# Patient Record
Sex: MALE | Race: WHITE
[De-identification: names, ages, dates, MRNs, and addresses within clinical notes are randomized per-mention and may not be internally consistent; named-entity substitution may affect disease eponyms.]

---

## 2020-07-20 ENCOUNTER — HOSPITAL ENCOUNTER (INPATIENT)
Dept: HOSPITAL 19 - COL.ER | Age: 70
LOS: 15 days | Discharge: NURSING, REHAB OR LONG-TERM CARE | DRG: 207 | End: 2020-08-04
Attending: INTERNAL MEDICINE | Admitting: INTERNAL MEDICINE
Payer: MEDICARE

## 2020-07-20 VITALS — OXYGEN SATURATION: 98 %

## 2020-07-20 VITALS — OXYGEN SATURATION: 97 %

## 2020-07-20 VITALS — OXYGEN SATURATION: 96 %

## 2020-07-20 VITALS — OXYGEN SATURATION: 99 %

## 2020-07-20 VITALS — OXYGEN SATURATION: 94 %

## 2020-07-20 VITALS — WEIGHT: 315 LBS | BODY MASS INDEX: 37.19 KG/M2 | HEIGHT: 77.01 IN

## 2020-07-20 VITALS — OXYGEN SATURATION: 95 %

## 2020-07-20 VITALS — OXYGEN SATURATION: 89 %

## 2020-07-20 VITALS — TEMPERATURE: 97.6 F | SYSTOLIC BLOOD PRESSURE: 144 MMHG | DIASTOLIC BLOOD PRESSURE: 63 MMHG | HEART RATE: 74 BPM

## 2020-07-20 VITALS — OXYGEN SATURATION: 87 %

## 2020-07-20 VITALS — OXYGEN SATURATION: 86 %

## 2020-07-20 VITALS — OXYGEN SATURATION: 100 %

## 2020-07-20 VITALS — OXYGEN SATURATION: 93 %

## 2020-07-20 VITALS — OXYGEN SATURATION: 90 %

## 2020-07-20 DIAGNOSIS — E66.01: ICD-10-CM

## 2020-07-20 DIAGNOSIS — R31.9: ICD-10-CM

## 2020-07-20 DIAGNOSIS — R41.0: ICD-10-CM

## 2020-07-20 DIAGNOSIS — N17.9: ICD-10-CM

## 2020-07-20 DIAGNOSIS — U07.1: Primary | ICD-10-CM

## 2020-07-20 DIAGNOSIS — J12.89: ICD-10-CM

## 2020-07-20 DIAGNOSIS — I71.4: ICD-10-CM

## 2020-07-20 DIAGNOSIS — R19.7: ICD-10-CM

## 2020-07-20 DIAGNOSIS — E78.5: ICD-10-CM

## 2020-07-20 DIAGNOSIS — I95.9: ICD-10-CM

## 2020-07-20 DIAGNOSIS — I73.9: ICD-10-CM

## 2020-07-20 DIAGNOSIS — E87.6: ICD-10-CM

## 2020-07-20 DIAGNOSIS — Z90.89: ICD-10-CM

## 2020-07-20 DIAGNOSIS — I21.A1: ICD-10-CM

## 2020-07-20 DIAGNOSIS — I11.0: ICD-10-CM

## 2020-07-20 DIAGNOSIS — G72.9: ICD-10-CM

## 2020-07-20 DIAGNOSIS — T38.0X5A: ICD-10-CM

## 2020-07-20 DIAGNOSIS — Z95.0: ICD-10-CM

## 2020-07-20 DIAGNOSIS — I50.30: ICD-10-CM

## 2020-07-20 DIAGNOSIS — E11.9: ICD-10-CM

## 2020-07-20 DIAGNOSIS — J96.01: ICD-10-CM

## 2020-07-20 DIAGNOSIS — Z90.49: ICD-10-CM

## 2020-07-20 DIAGNOSIS — Z85.831: ICD-10-CM

## 2020-07-20 DIAGNOSIS — M10.9: ICD-10-CM

## 2020-07-20 DIAGNOSIS — I48.91: ICD-10-CM

## 2020-07-20 DIAGNOSIS — G47.33: ICD-10-CM

## 2020-07-20 LAB
ALBUMIN SERPL-MCNC: 3.6 GM/DL (ref 3.5–5)
ALP SERPL-CCNC: 170 U/L (ref 50–136)
ALT SERPL-CCNC: 142 U/L (ref 4–49)
ANION GAP SERPL CALC-SCNC: 12 MMOL/L (ref 7–16)
APAP SERPL-MCNC: < 10 UG/ML (ref 10–30)
AST SERPL-CCNC: 251 U/L (ref 15–37)
BASE EXCESS BLDA CALC-SCNC: -4.2 MMOL/L (ref -2–2)
BASE EXCESS BLDA CALC-SCNC: -6.2 MMOL/L (ref -2–2)
BASOPHILS # BLD: 0 10*3/UL (ref 0–0.2)
BASOPHILS NFR BLD AUTO: 0 % (ref 0–2)
BILIRUB SERPL-MCNC: 1.4 MG/DL (ref 0–1)
BUN SERPL-MCNC: 34 MG/DL (ref 9–20)
CALCIUM SERPL-MCNC: 8 MG/DL (ref 8.4–10.2)
CHLORIDE SERPL-SCNC: 99 MMOL/L (ref 98–107)
CO2 BLDA-SCNC: 17.8 MMOL/L
CO2 BLDA-SCNC: 19.8 MMOL/L
CO2 SERPL-SCNC: 21 MMOL/L (ref 22–30)
CREAT SERPL-SCNC: 1.84 UMOL/L (ref 0.66–1.25)
EOSINOPHIL # BLD: 0 10*3/UL (ref 0–0.7)
EOSINOPHIL NFR BLD: 0 % (ref 0–4)
ERYTHROCYTE [DISTWIDTH] IN BLOOD BY AUTOMATED COUNT: 12.7 % (ref 11.5–14.5)
GLUCOSE SERPL-MCNC: 173 MG/DL (ref 74–106)
GRANULOCYTES # BLD AUTO: 88.8 % (ref 42.2–75.2)
HCO3 BLDA-SCNC: 16.9 MEQ/L (ref 22–26)
HCO3 BLDA-SCNC: 18.9 MEQ/L (ref 22–26)
HCT VFR BLD AUTO: 40.3 % (ref 42–52)
HGB BLD-MCNC: 14 G/DL (ref 13.5–18)
INHALED O2 CONCENTRATION: 21 %
LYMPHOCYTES # BLD: 0.3 10*3/UL (ref 1.2–3.4)
LYMPHOCYTES NFR BLD: 5 % (ref 20–51)
MCH RBC QN AUTO: 34 PG (ref 27–31)
MCHC RBC AUTO-ENTMCNC: 35 G/DL (ref 33–37)
MCV RBC AUTO: 97 FL (ref 80–100)
MONOCYTES # BLD: 0.4 10*3/UL (ref 0.1–0.6)
MONOCYTES NFR BLD AUTO: 5.5 % (ref 1.7–9.3)
NEUTROPHILS # BLD: 6.1 10*3/UL (ref 1.4–6.5)
PCO2 BLDA: 27.8 MMHG (ref 35–45)
PCO2 BLDA: 29.7 MMHG (ref 35–45)
PLATELET # BLD AUTO: 240 K/MM3 (ref 130–400)
PMV BLD AUTO: 10.6 FL (ref 7.4–10.4)
PO2 BLDA: 34.3 MMHG (ref 80–100)
PO2 BLDA: 68.3 MMHG (ref 80–100)
POTASSIUM SERPL-SCNC: 3.4 MMOL/L (ref 3.4–5)
PROT SERPL-MCNC: 7.1 GM/DL (ref 6.4–8.2)
RBC # BLD AUTO: 4.16 M/MM3 (ref 4.2–5.6)
SAO2 % BLDA: 63.4 % (ref 92–100)
SAO2 % BLDA: 93.1 % (ref 92–100)
SODIUM SERPL-SCNC: 133 MMOL/L (ref 137–145)
TROPONIN I SERPL-MCNC: 0.09 NG/ML (ref 0–0.04)
TROPONIN-I 3 HR POST INITIAL: 0.06 NG/ML (ref 0–0.03)

## 2020-07-20 PROCEDURE — A4314 CATH W/DRAINAGE 2-WAY LATEX: HCPCS

## 2020-07-20 PROCEDURE — C1751 CATH, INF, PER/CENT/MIDLINE: HCPCS

## 2020-07-20 PROCEDURE — C9113 INJ PANTOPRAZOLE SODIUM, VIA: HCPCS

## 2020-07-20 NOTE — NUR
Patient has requested to use the restroom for a BM. With the assistance of
RT Sahra, Patient assisted up to bedside commode. Patient is unsteady on his
feet and does desaturate with exertion. Patient is a 2 assist to move. Patient
produces a BM and sample sent to lab. Patient returns to bed. Patient made
comfortable. He has no further needs at this time. Will continue to monitor.
Call light within reach.

## 2020-07-20 NOTE — NUR
Patient arrives at this time via ED cart with Oxymask in place. Patient moves
self over to unit bed via slide. Patient attached to unit monitoring
equipment. Patient's O2 sat found to be at 58% with a perfect waveform.
Patient switched back to BiPAP immediately and O2 comes up. Patient switched
from street clothes to gown. Patient states he is only slightly SOA, but
otherwise feels fine. Assessment complete, see admission B for details. Lungs
are diminished to auscultation. Patient does have some slight BLE edema. Med
rec complete as well as allergies. Patient is a PUI and precautions followed
since arrival. Dr Reid notified of arrival. Confirmed that he needs
potassium replacement, also put on AHA diet, ok to give oral replacement of
K+. Patient has no further needs at this time. Will provide care as orders
come through. Will continue to monitor Call light within reach. SPoke with
son, Salvador, and wife, Mireya and gave updates to both.

## 2020-07-21 VITALS — OXYGEN SATURATION: 98 %

## 2020-07-21 VITALS — OXYGEN SATURATION: 96 %

## 2020-07-21 VITALS — OXYGEN SATURATION: 97 %

## 2020-07-21 VITALS — OXYGEN SATURATION: 95 %

## 2020-07-21 VITALS — OXYGEN SATURATION: 92 %

## 2020-07-21 VITALS — OXYGEN SATURATION: 94 %

## 2020-07-21 VITALS — OXYGEN SATURATION: 91 %

## 2020-07-21 VITALS — OXYGEN SATURATION: 89 %

## 2020-07-21 VITALS — OXYGEN SATURATION: 99 %

## 2020-07-21 VITALS — OXYGEN SATURATION: 90 %

## 2020-07-21 VITALS — HEART RATE: 70 BPM | SYSTOLIC BLOOD PRESSURE: 120 MMHG | DIASTOLIC BLOOD PRESSURE: 70 MMHG | TEMPERATURE: 99.8 F

## 2020-07-21 VITALS — OXYGEN SATURATION: 88 %

## 2020-07-21 VITALS — DIASTOLIC BLOOD PRESSURE: 71 MMHG | SYSTOLIC BLOOD PRESSURE: 166 MMHG | OXYGEN SATURATION: 98 % | HEART RATE: 87 BPM

## 2020-07-21 VITALS — OXYGEN SATURATION: 93 % | DIASTOLIC BLOOD PRESSURE: 44 MMHG | SYSTOLIC BLOOD PRESSURE: 67 MMHG | HEART RATE: 69 BPM

## 2020-07-21 VITALS
SYSTOLIC BLOOD PRESSURE: 139 MMHG | HEART RATE: 71 BPM | TEMPERATURE: 98.1 F | DIASTOLIC BLOOD PRESSURE: 100 MMHG | OXYGEN SATURATION: 94 %

## 2020-07-21 VITALS
HEART RATE: 104 BPM | TEMPERATURE: 98.8 F | OXYGEN SATURATION: 98 % | DIASTOLIC BLOOD PRESSURE: 63 MMHG | SYSTOLIC BLOOD PRESSURE: 145 MMHG

## 2020-07-21 VITALS — OXYGEN SATURATION: 87 %

## 2020-07-21 VITALS — SYSTOLIC BLOOD PRESSURE: 114 MMHG | DIASTOLIC BLOOD PRESSURE: 62 MMHG | HEART RATE: 69 BPM

## 2020-07-21 VITALS — DIASTOLIC BLOOD PRESSURE: 54 MMHG | HEART RATE: 93 BPM | SYSTOLIC BLOOD PRESSURE: 107 MMHG

## 2020-07-21 VITALS — DIASTOLIC BLOOD PRESSURE: 44 MMHG | HEART RATE: 69 BPM | SYSTOLIC BLOOD PRESSURE: 67 MMHG | OXYGEN SATURATION: 93 %

## 2020-07-21 VITALS — OXYGEN SATURATION: 93 %

## 2020-07-21 VITALS — OXYGEN SATURATION: 95 % | SYSTOLIC BLOOD PRESSURE: 75 MMHG | DIASTOLIC BLOOD PRESSURE: 41 MMHG | HEART RATE: 78 BPM

## 2020-07-21 VITALS — HEART RATE: 69 BPM | SYSTOLIC BLOOD PRESSURE: 100 MMHG | DIASTOLIC BLOOD PRESSURE: 53 MMHG | TEMPERATURE: 99.2 F

## 2020-07-21 VITALS — SYSTOLIC BLOOD PRESSURE: 101 MMHG | HEART RATE: 69 BPM | OXYGEN SATURATION: 98 % | DIASTOLIC BLOOD PRESSURE: 62 MMHG

## 2020-07-21 VITALS — HEART RATE: 69 BPM | DIASTOLIC BLOOD PRESSURE: 70 MMHG | TEMPERATURE: 97.5 F | SYSTOLIC BLOOD PRESSURE: 126 MMHG

## 2020-07-21 VITALS — DIASTOLIC BLOOD PRESSURE: 63 MMHG | SYSTOLIC BLOOD PRESSURE: 145 MMHG | OXYGEN SATURATION: 97 % | HEART RATE: 93 BPM

## 2020-07-21 VITALS — OXYGEN SATURATION: 86 %

## 2020-07-21 VITALS — HEART RATE: 81 BPM | DIASTOLIC BLOOD PRESSURE: 87 MMHG | SYSTOLIC BLOOD PRESSURE: 139 MMHG

## 2020-07-21 VITALS — OXYGEN SATURATION: 85 %

## 2020-07-21 VITALS
TEMPERATURE: 97.7 F | HEART RATE: 69 BPM | SYSTOLIC BLOOD PRESSURE: 120 MMHG | DIASTOLIC BLOOD PRESSURE: 61 MMHG | OXYGEN SATURATION: 98 %

## 2020-07-21 VITALS — SYSTOLIC BLOOD PRESSURE: 119 MMHG | HEART RATE: 69 BPM | OXYGEN SATURATION: 99 % | DIASTOLIC BLOOD PRESSURE: 62 MMHG

## 2020-07-21 VITALS — OXYGEN SATURATION: 83 %

## 2020-07-21 VITALS — HEART RATE: 93 BPM | SYSTOLIC BLOOD PRESSURE: 107 MMHG | DIASTOLIC BLOOD PRESSURE: 54 MMHG | OXYGEN SATURATION: 95 %

## 2020-07-21 VITALS — OXYGEN SATURATION: 78 %

## 2020-07-21 VITALS — SYSTOLIC BLOOD PRESSURE: 145 MMHG | DIASTOLIC BLOOD PRESSURE: 63 MMHG | HEART RATE: 93 BPM

## 2020-07-21 VITALS — DIASTOLIC BLOOD PRESSURE: 54 MMHG | SYSTOLIC BLOOD PRESSURE: 107 MMHG | HEART RATE: 93 BPM

## 2020-07-21 VITALS — HEART RATE: 78 BPM | SYSTOLIC BLOOD PRESSURE: 75 MMHG | DIASTOLIC BLOOD PRESSURE: 41 MMHG

## 2020-07-21 VITALS — OXYGEN SATURATION: 82 %

## 2020-07-21 VITALS — OXYGEN SATURATION: 71 %

## 2020-07-21 VITALS — OXYGEN SATURATION: 81 %

## 2020-07-21 VITALS — OXYGEN SATURATION: 84 %

## 2020-07-21 LAB
ALBUMIN SERPL-MCNC: 3.1 GM/DL (ref 3.5–5)
ALP SERPL-CCNC: 157 U/L (ref 50–136)
ALT SERPL-CCNC: 122 U/L (ref 4–49)
ANION GAP SERPL CALC-SCNC: 13 MMOL/L (ref 7–16)
ANION GAP SERPL CALC-SCNC: 8 MMOL/L (ref 7–16)
AST SERPL-CCNC: 177 U/L (ref 15–37)
BASE EXCESS BLDA CALC-SCNC: -2.6 MMOL/L (ref -2–2)
BASE EXCESS BLDA CALC-SCNC: -7 MMOL/L (ref -2–2)
BASE EXCESS BLDA CALC-SCNC: -8.8 MMOL/L (ref -2–2)
BASE EXCESS BLDA CALC-SCNC: -9.7 MMOL/L (ref -2–2)
BASOPHILS # BLD: 0 10*3/UL (ref 0–0.2)
BASOPHILS NFR BLD AUTO: 0.2 % (ref 0–2)
BILIRUB SERPL-MCNC: 1.2 MG/DL (ref 0–1)
BUN SERPL-MCNC: 32 MG/DL (ref 9–20)
BUN SERPL-MCNC: 33 MG/DL (ref 9–20)
C DIFF TOX A+B STL IA-ACNC: (no result)
C DIFF TOX A+B STL QL IA: (no result)
CALCIUM SERPL-MCNC: 7.7 MG/DL (ref 8.4–10.2)
CALCIUM SERPL-MCNC: 7.8 MG/DL (ref 8.4–10.2)
CHLORIDE SERPL-SCNC: 100 MMOL/L (ref 98–107)
CHLORIDE SERPL-SCNC: 101 MMOL/L (ref 98–107)
CHOLEST SPEC-SCNC: 99 MG/DL (ref 120–200)
CHOLEST/HDLC SERPL-SRTO: 4.7
CO2 BLDA-SCNC: 18.2 MMOL/L
CO2 BLDA-SCNC: 19.1 MMOL/L
CO2 BLDA-SCNC: 19.7 MMOL/L
CO2 SERPL-SCNC: 20 MMOL/L (ref 22–30)
CO2 SERPL-SCNC: 26 MMOL/L (ref 22–30)
CREAT SERPL-SCNC: 1.53 UMOL/L (ref 0.66–1.25)
CREAT SERPL-SCNC: 1.8 UMOL/L (ref 0.66–1.25)
EOSINOPHIL # BLD: 0 10*3/UL (ref 0–0.7)
EOSINOPHIL NFR BLD: 0.3 % (ref 0–4)
ERYTHROCYTE [DISTWIDTH] IN BLOOD BY AUTOMATED COUNT: 12.8 % (ref 11.5–14.5)
GLUCOSE SERPL-MCNC: 134 MG/DL (ref 74–106)
GLUCOSE SERPL-MCNC: 269 MG/DL (ref 74–106)
GRANULOCYTES # BLD AUTO: 90 % (ref 42.2–75.2)
HCO3 BLDA-SCNC: 17 MEQ/L (ref 22–26)
HCO3 BLDA-SCNC: 18 MEQ/L (ref 22–26)
HCO3 BLDA-SCNC: 18.4 MEQ/L (ref 22–26)
HCO3 BLDA-SCNC: 20 MEQ/L (ref 22–26)
HCT VFR BLD AUTO: 38.7 % (ref 42–52)
HDLC SERPL-MCNC: 21 MG/DL
HGB BLD-MCNC: 13.5 G/DL (ref 13.5–18)
INHALED O2 CONCENTRATION: 100 %
INHALED O2 CONCENTRATION: 100 %
INHALED O2 CONCENTRATION: 65 %
INHALED O2 CONCENTRATION: 80 %
LDLC SERPL-MCNC: 57 MG/DL
LYMPHOCYTES # BLD: 0.4 10*3/UL (ref 1.2–3.4)
LYMPHOCYTES NFR BLD: 5.6 % (ref 20–51)
MAGNESIUM SERPL-MCNC: 1.9 MG/DL (ref 1.6–2.3)
MCH RBC QN AUTO: 34 PG (ref 27–31)
MCHC RBC AUTO-ENTMCNC: 35 G/DL (ref 33–37)
MCV RBC AUTO: 98 FL (ref 80–100)
MONOCYTES # BLD: 0.2 10*3/UL (ref 0.1–0.6)
MONOCYTES NFR BLD AUTO: 3.3 % (ref 1.7–9.3)
NEUTROPHILS # BLD: 5.8 10*3/UL (ref 1.4–6.5)
PCO2 BLDA: 28.9 MMHG (ref 35–45)
PCO2 BLDA: 35 MMHG (ref 35–45)
PCO2 BLDA: 39.9 MMHG (ref 35–45)
PCO2 BLDA: 44.1 MMHG (ref 35–45)
PHOSPHATE SERPL-MCNC: 4.2 MG/DL (ref 2.5–4.5)
PLATELET # BLD AUTO: 215 K/MM3 (ref 130–400)
PMV BLD AUTO: 10.4 FL (ref 7.4–10.4)
PO2 BLDA: 123.4 MMHG (ref 80–100)
PO2 BLDA: 128.8 MMHG (ref 80–100)
PO2 BLDA: 152.4 MMHG (ref 80–100)
PO2 BLDA: 72.8 MMHG (ref 80–100)
POTASSIUM SERPL-SCNC: 3.5 MMOL/L (ref 3.4–5)
POTASSIUM SERPL-SCNC: 4 MMOL/L (ref 3.4–5)
PROT SERPL-MCNC: 6.5 GM/DL (ref 6.4–8.2)
RBC # BLD AUTO: 3.97 M/MM3 (ref 4.2–5.6)
SAO2 % BLDA: 94.9 % (ref 92–100)
SAO2 % BLDA: 98.1 % (ref 92–100)
SAO2 % BLDA: 98.3 % (ref 92–100)
SAO2 % BLDA: 98.8 % (ref 92–100)
SODIUM SERPL-SCNC: 133 MMOL/L (ref 137–145)
SODIUM SERPL-SCNC: 134 MMOL/L (ref 137–145)
TRIGL SERPL-MCNC: 107 MG/DL
TROPONIN I SERPL-MCNC: 0.05 NG/ML (ref 0–0.04)

## 2020-07-21 PROCEDURE — 5A1955Z RESPIRATORY VENTILATION, GREATER THAN 96 CONSECUTIVE HOURS: ICD-10-PCS | Performed by: FAMILY MEDICINE

## 2020-07-21 PROCEDURE — 0BH17EZ INSERTION OF ENDOTRACHEAL AIRWAY INTO TRACHEA, VIA NATURAL OR ARTIFICIAL OPENING: ICD-10-PCS | Performed by: FAMILY MEDICINE

## 2020-07-21 PROCEDURE — 02HV33Z INSERTION OF INFUSION DEVICE INTO SUPERIOR VENA CAVA, PERCUTANEOUS APPROACH: ICD-10-PCS

## 2020-07-21 NOTE — NUR
Spoke to patients wife, Mireya, and updated her on the patients status post
intubation. Answered all questions she had.

## 2020-07-21 NOTE — NUR
Patient asleep and resting well on the vent. Vitals obtained and remain
stable. Assessment complete, see shift assessment for details. Patient has no
gastric residuals, placement confirmed by auscultation. OG tube clamped for
medications. No further needs at this time. Will continue to monitor. Call
light within reach.

## 2020-07-21 NOTE — NUR
Tried to switch patient to airvo and patient was not maintaing O2 sats.
 notified and said to call anesthesia so we can intubate patient.
Anestesia was called and notified.

## 2020-07-21 NOTE — NUR
contacted patient's wife, Mireya (ph#[phone]) as patient is
intubated. Patient lives in [place] with Mireya and sees Dr. Torres for
primary care. Patient obtains medications from [facility] with no
difficulties. Patient uses a CPAP and no other DME. Mireya reports patient is
normally independent with ADLS but started having issues about eight days ago.
Mireya reports patient has Advance Directives and will email LUISA a copy. LUISA
provided email and phone number to Mireya. LUISA will continue to follow for
discharge needs.

## 2020-07-21 NOTE — NUR
at bedside. Told RT to switch patient over to airvo and check ABG 2
hours after. Notified him that patients covid swab came back positive. He
called family and told patient.

## 2020-07-21 NOTE — NUR
Patient successfully intubated. RN, RT and CRNA in room. Confirmed with cxray.
Powers, OG and PICC were placed after.  will be back at 1600 to prone
patient.

## 2020-07-22 VITALS — OXYGEN SATURATION: 98 %

## 2020-07-22 VITALS — OXYGEN SATURATION: 96 %

## 2020-07-22 VITALS — HEART RATE: 69 BPM | OXYGEN SATURATION: 97 % | SYSTOLIC BLOOD PRESSURE: 132 MMHG | DIASTOLIC BLOOD PRESSURE: 69 MMHG

## 2020-07-22 VITALS — OXYGEN SATURATION: 95 %

## 2020-07-22 VITALS — OXYGEN SATURATION: 97 %

## 2020-07-22 VITALS — OXYGEN SATURATION: 100 %

## 2020-07-22 VITALS
TEMPERATURE: 96.4 F | SYSTOLIC BLOOD PRESSURE: 90 MMHG | HEART RATE: 69 BPM | OXYGEN SATURATION: 98 % | DIASTOLIC BLOOD PRESSURE: 66 MMHG

## 2020-07-22 VITALS — OXYGEN SATURATION: 92 %

## 2020-07-22 VITALS — OXYGEN SATURATION: 99 %

## 2020-07-22 VITALS — SYSTOLIC BLOOD PRESSURE: 79 MMHG | HEART RATE: 59 BPM | DIASTOLIC BLOOD PRESSURE: 56 MMHG | TEMPERATURE: 96 F

## 2020-07-22 VITALS — OXYGEN SATURATION: 85 %

## 2020-07-22 VITALS — SYSTOLIC BLOOD PRESSURE: 106 MMHG | DIASTOLIC BLOOD PRESSURE: 63 MMHG | TEMPERATURE: 97.3 F | HEART RATE: 69 BPM

## 2020-07-22 VITALS — SYSTOLIC BLOOD PRESSURE: 116 MMHG | DIASTOLIC BLOOD PRESSURE: 64 MMHG | HEART RATE: 69 BPM

## 2020-07-22 VITALS — SYSTOLIC BLOOD PRESSURE: 118 MMHG | DIASTOLIC BLOOD PRESSURE: 54 MMHG | HEART RATE: 69 BPM | TEMPERATURE: 99.3 F

## 2020-07-22 VITALS — TEMPERATURE: 97.5 F | DIASTOLIC BLOOD PRESSURE: 67 MMHG | HEART RATE: 69 BPM | SYSTOLIC BLOOD PRESSURE: 112 MMHG

## 2020-07-22 VITALS — SYSTOLIC BLOOD PRESSURE: 121 MMHG | TEMPERATURE: 96.6 F | HEART RATE: 69 BPM | DIASTOLIC BLOOD PRESSURE: 61 MMHG

## 2020-07-22 VITALS — DIASTOLIC BLOOD PRESSURE: 60 MMHG | HEART RATE: 69 BPM | SYSTOLIC BLOOD PRESSURE: 118 MMHG

## 2020-07-22 VITALS — OXYGEN SATURATION: 90 %

## 2020-07-22 VITALS — SYSTOLIC BLOOD PRESSURE: 104 MMHG | DIASTOLIC BLOOD PRESSURE: 68 MMHG | HEART RATE: 73 BPM

## 2020-07-22 VITALS — HEART RATE: 69 BPM | DIASTOLIC BLOOD PRESSURE: 70 MMHG | SYSTOLIC BLOOD PRESSURE: 115 MMHG

## 2020-07-22 VITALS — OXYGEN SATURATION: 88 %

## 2020-07-22 VITALS — OXYGEN SATURATION: 94 %

## 2020-07-22 VITALS — OXYGEN SATURATION: 91 %

## 2020-07-22 VITALS — OXYGEN SATURATION: 89 %

## 2020-07-22 VITALS — OXYGEN SATURATION: 87 %

## 2020-07-22 VITALS — OXYGEN SATURATION: 93 %

## 2020-07-22 LAB
ALBUMIN SERPL-MCNC: 2.9 GM/DL (ref 3.5–5)
ALP SERPL-CCNC: 138 U/L (ref 50–136)
ALT SERPL-CCNC: 89 U/L (ref 4–49)
ANION GAP SERPL CALC-SCNC: 10 MMOL/L (ref 7–16)
AST SERPL-CCNC: 108 U/L (ref 15–37)
BASE EXCESS BLDA CALC-SCNC: -6 MMOL/L (ref -2–2)
BILIRUB SERPL-MCNC: 1.3 MG/DL (ref 0–1)
BUN SERPL-MCNC: 35 MG/DL (ref 9–20)
CALCIUM SERPL-MCNC: 7.5 MG/DL (ref 8.4–10.2)
CHLORIDE SERPL-SCNC: 100 MMOL/L (ref 98–107)
CO2 BLDA-SCNC: 18.3 MMOL/L
CO2 SERPL-SCNC: 25 MMOL/L (ref 22–30)
CREAT SERPL-SCNC: 1.61 UMOL/L (ref 0.66–1.25)
ERYTHROCYTE [DISTWIDTH] IN BLOOD BY AUTOMATED COUNT: 13 % (ref 11.5–14.5)
GLUCOSE SERPL-MCNC: 213 MG/DL (ref 74–106)
HCO3 BLDA-SCNC: 17.4 MEQ/L (ref 22–26)
HCT VFR BLD AUTO: 36.9 % (ref 42–52)
HGB BLD-MCNC: 12.5 G/DL (ref 13.5–18)
HYPOCHROMIA BLD QL SMEAR: (no result)
INHALED O2 CONCENTRATION: 65 %
LYMPHOCYTES NFR BLD MANUAL: 4 % (ref 20–51)
MAGNESIUM SERPL-MCNC: 2 MG/DL (ref 1.6–2.3)
MCH RBC QN AUTO: 34 PG (ref 27–31)
MCHC RBC AUTO-ENTMCNC: 34 G/DL (ref 33–37)
MCV RBC AUTO: 99 FL (ref 80–100)
MONOCYTES NFR BLD: 2 % (ref 1.7–9.3)
NEUTS BAND NFR BLD: 12 % (ref 0–10)
NEUTS SEG NFR BLD MANUAL: 82 % (ref 42–75.2)
PCO2 BLDA: 29.1 MMHG (ref 35–45)
PHOSPHATE SERPL-MCNC: 3.2 MG/DL (ref 2.5–4.5)
PLATELET # BLD AUTO: 198 K/MM3 (ref 130–400)
PLATELET BLD QL SMEAR: NORMAL
PMV BLD AUTO: 10.6 FL (ref 7.4–10.4)
PO2 BLDA: 82.8 MMHG (ref 80–100)
POTASSIUM SERPL-SCNC: 3.3 MMOL/L (ref 3.4–5)
PROT SERPL-MCNC: 6 GM/DL (ref 6.4–8.2)
RBC # BLD AUTO: 3.73 M/MM3 (ref 4.2–5.6)
SAO2 % BLDA: 96.3 % (ref 92–100)
SODIUM SERPL-SCNC: 134 MMOL/L (ref 137–145)

## 2020-07-22 NOTE — NUR
PT placed in prone position  AT 1700 WITH HELP OF 3RNs and RT. Patient
tolerated well. Tube feedings on hold. LIS restarted. Will continue to
monitor.

## 2020-07-22 NOTE — NUR
Patient awakens much more readily now that sedation is turned down. He follows
commands and answers questions appropriately. Does nod his head when asked if
in pain, he signifies a little bit. Fentanyl not turned down at this time.

## 2020-07-22 NOTE — NUR
Patient continues to rest peacefully. Assessment complete with no changes from
previous exam, OG tube restarted and has slight light green output when
changed back to LIS. Repositioned for comfort. No further needs at this time.
Will continue to monitor. Call light within reach.

## 2020-07-23 VITALS — OXYGEN SATURATION: 96 %

## 2020-07-23 VITALS — OXYGEN SATURATION: 97 %

## 2020-07-23 VITALS — OXYGEN SATURATION: 98 %

## 2020-07-23 VITALS — OXYGEN SATURATION: 95 %

## 2020-07-23 VITALS — OXYGEN SATURATION: 99 %

## 2020-07-23 VITALS — OXYGEN SATURATION: 100 %

## 2020-07-23 VITALS
SYSTOLIC BLOOD PRESSURE: 110 MMHG | DIASTOLIC BLOOD PRESSURE: 88 MMHG | OXYGEN SATURATION: 99 % | TEMPERATURE: 97.8 F | HEART RATE: 69 BPM

## 2020-07-23 VITALS — SYSTOLIC BLOOD PRESSURE: 96 MMHG | HEART RATE: 69 BPM | DIASTOLIC BLOOD PRESSURE: 60 MMHG | OXYGEN SATURATION: 96 %

## 2020-07-23 VITALS — OXYGEN SATURATION: 93 %

## 2020-07-23 VITALS — OXYGEN SATURATION: 94 %

## 2020-07-23 VITALS — HEART RATE: 69 BPM | TEMPERATURE: 98 F | DIASTOLIC BLOOD PRESSURE: 61 MMHG | SYSTOLIC BLOOD PRESSURE: 101 MMHG

## 2020-07-23 VITALS — HEART RATE: 69 BPM | TEMPERATURE: 99.1 F | DIASTOLIC BLOOD PRESSURE: 64 MMHG | SYSTOLIC BLOOD PRESSURE: 95 MMHG

## 2020-07-23 VITALS
OXYGEN SATURATION: 97 % | DIASTOLIC BLOOD PRESSURE: 64 MMHG | TEMPERATURE: 99.1 F | SYSTOLIC BLOOD PRESSURE: 101 MMHG | HEART RATE: 69 BPM

## 2020-07-23 VITALS — OXYGEN SATURATION: 88 %

## 2020-07-23 LAB
ALBUMIN SERPL-MCNC: 2.7 GM/DL (ref 3.5–5)
ALP SERPL-CCNC: 119 U/L (ref 50–136)
ALT SERPL-CCNC: 70 U/L (ref 4–49)
ANION GAP SERPL CALC-SCNC: 10 MMOL/L (ref 7–16)
AST SERPL-CCNC: 72 U/L (ref 15–37)
BASE EXCESS BLDA CALC-SCNC: -6.7 MMOL/L (ref -2–2)
BASE EXCESS BLDA CALC-SCNC: -8.1 MMOL/L (ref -2–2)
BILIRUB SERPL-MCNC: 0.8 MG/DL (ref 0–1)
BUN SERPL-MCNC: 47 MG/DL (ref 9–20)
CALCIUM SERPL-MCNC: 7.1 MG/DL (ref 8.4–10.2)
CHLORIDE SERPL-SCNC: 103 MMOL/L (ref 98–107)
CO2 BLDA-SCNC: 17.4 MMOL/L
CO2 BLDA-SCNC: 18.8 MMOL/L
CO2 SERPL-SCNC: 22 MMOL/L (ref 22–30)
CREAT SERPL-SCNC: 2 UMOL/L (ref 0.66–1.25)
ERYTHROCYTE [DISTWIDTH] IN BLOOD BY AUTOMATED COUNT: 13 % (ref 11.5–14.5)
GLUCOSE SERPL-MCNC: 157 MG/DL (ref 74–106)
HCO3 BLDA-SCNC: 16.4 MEQ/L (ref 22–26)
HCO3 BLDA-SCNC: 17.8 MEQ/L (ref 22–26)
HCT VFR BLD AUTO: 45.6 % (ref 42–52)
HGB BLD-MCNC: 15.8 G/DL (ref 13.5–18)
INHALED O2 CONCENTRATION: 50 %
INHALED O2 CONCENTRATION: 55 %
LYMPHOCYTES NFR BLD MANUAL: 6 % (ref 20–51)
MAGNESIUM SERPL-MCNC: 2 MG/DL (ref 1.6–2.3)
MCH RBC QN AUTO: 33 PG (ref 27–31)
MCHC RBC AUTO-ENTMCNC: 35 G/DL (ref 33–37)
MCV RBC AUTO: 96 FL (ref 80–100)
MONOCYTES NFR BLD: 2 % (ref 1.7–9.3)
NEUTS BAND NFR BLD: 10 % (ref 0–10)
NEUTS SEG NFR BLD MANUAL: 82 % (ref 42–75.2)
PCO2 BLDA: 30.4 MMHG (ref 35–45)
PCO2 BLDA: 32.6 MMHG (ref 35–45)
PHOSPHATE SERPL-MCNC: 5.3 MG/DL (ref 2.5–4.5)
PLATELET # BLD AUTO: 187 K/MM3 (ref 130–400)
PLATELET BLD QL SMEAR: NORMAL
PMV BLD AUTO: 11.1 FL (ref 7.4–10.4)
PO2 BLDA: 109.2 MMHG (ref 80–100)
PO2 BLDA: 117 MMHG (ref 80–100)
POTASSIUM SERPL-SCNC: 3.6 MMOL/L (ref 3.4–5)
PROT SERPL-MCNC: 5.9 GM/DL (ref 6.4–8.2)
RBC # BLD AUTO: 4.73 M/MM3 (ref 4.2–5.6)
SAO2 % BLDA: 97.7 % (ref 92–100)
SAO2 % BLDA: 98.1 % (ref 92–100)
SODIUM SERPL-SCNC: 134 MMOL/L (ref 137–145)

## 2020-07-23 NOTE — NUR
PATIENT OFF SESDATION AND ANESTHIA FOR A SMALL AMOOUNT OF TIME BEGINS COUGH
WITH MOVEMENT BACK ON SEDATION CAUSE OF PRONE POSITION

## 2020-07-23 NOTE — NUR
1930 - REPORT RECEIVED FROM VON GEORGE.
2000 - PT AWAKE UPON ENTRY AS PROPOFOL COMPLETED, NEW BOTTLE HUNG AND PT PUT
BACK TO SEDATION. PT DOES FOLLOW COMMAND AND NODS WHEN AWAKE. PT REMAINS ON
PRONE POSITION, VENT SETTINGS CHECKED, ALL LINES AND TUBES REVIEWED. VSS. WILL
CONTINUE MONITORING.

## 2020-07-23 NOTE — NUR
PT PLACED IN PRONE POSITION PER  ORDER.  PT TOLERATING IT WELL.
ALARMS SET AND FUNCTIONING.  VITAL SIGNS STABLE.

## 2020-07-23 NOTE — NUR
Sedation vacation continued throughout shift. patient tolerated this dosing
without issue. Rests with eyes closed but rouses easily to voice. Able to
communicate needs, and follow commands.
 
Urine output severely decreased throughout shift. Nephrology was consulted and
rounded this evening. ATN diagnosed. Urine is tea colored with coffee ground
sediment. TF resumed at 15ml/hr at 1200, increased to 30ml/hr at this time.
 
Following TF hold, Pt
placed in prone position with assist x 4 (3rn and Respiratory therapist)
Right arm secured above head, Left arm secured at waist. Pt continues to
communicate needs and follow commands, requests sedation be increased while
prone. See flowsheet for dosing.
 
1900 report provided to oncoming shift.
 
2000 update provided to Andre JI requested. RT notified.
 
Afebrile throughout shift. Tmax 98.3.

## 2020-07-24 VITALS — OXYGEN SATURATION: 100 %

## 2020-07-24 VITALS — OXYGEN SATURATION: 96 %

## 2020-07-24 VITALS — OXYGEN SATURATION: 95 %

## 2020-07-24 VITALS — OXYGEN SATURATION: 93 %

## 2020-07-24 VITALS — OXYGEN SATURATION: 97 %

## 2020-07-24 VITALS
SYSTOLIC BLOOD PRESSURE: 103 MMHG | OXYGEN SATURATION: 98 % | TEMPERATURE: 97.8 F | HEART RATE: 69 BPM | DIASTOLIC BLOOD PRESSURE: 64 MMHG

## 2020-07-24 VITALS — TEMPERATURE: 96.2 F | HEART RATE: 69 BPM | DIASTOLIC BLOOD PRESSURE: 71 MMHG | SYSTOLIC BLOOD PRESSURE: 116 MMHG

## 2020-07-24 VITALS — OXYGEN SATURATION: 94 %

## 2020-07-24 VITALS — DIASTOLIC BLOOD PRESSURE: 69 MMHG | HEART RATE: 69 BPM | SYSTOLIC BLOOD PRESSURE: 111 MMHG | TEMPERATURE: 96.9 F

## 2020-07-24 VITALS — TEMPERATURE: 97.1 F | DIASTOLIC BLOOD PRESSURE: 65 MMHG | HEART RATE: 69 BPM | SYSTOLIC BLOOD PRESSURE: 105 MMHG

## 2020-07-24 VITALS — OXYGEN SATURATION: 92 %

## 2020-07-24 VITALS — OXYGEN SATURATION: 99 %

## 2020-07-24 VITALS — OXYGEN SATURATION: 98 %

## 2020-07-24 VITALS
SYSTOLIC BLOOD PRESSURE: 102 MMHG | HEART RATE: 69 BPM | OXYGEN SATURATION: 95 % | TEMPERATURE: 98 F | DIASTOLIC BLOOD PRESSURE: 64 MMHG

## 2020-07-24 VITALS — SYSTOLIC BLOOD PRESSURE: 128 MMHG | TEMPERATURE: 97.8 F | DIASTOLIC BLOOD PRESSURE: 78 MMHG | HEART RATE: 69 BPM

## 2020-07-24 LAB
ALBUMIN SERPL-MCNC: 2.7 GM/DL (ref 3.5–5)
ALP SERPL-CCNC: 122 U/L (ref 50–136)
ALT SERPL-CCNC: 70 U/L (ref 4–49)
ANION GAP SERPL CALC-SCNC: 9 MMOL/L (ref 7–16)
AST SERPL-CCNC: 85 U/L (ref 15–37)
BASE EXCESS BLDA CALC-SCNC: -5.8 MMOL/L (ref -2–2)
BILIRUB SERPL-MCNC: 0.7 MG/DL (ref 0–1)
BUN SERPL-MCNC: 60 MG/DL (ref 9–20)
CALCIUM SERPL-MCNC: 7.1 MG/DL (ref 8.4–10.2)
CHLORIDE SERPL-SCNC: 105 MMOL/L (ref 98–107)
CO2 BLDA-SCNC: 18.9 MMOL/L
CO2 SERPL-SCNC: 20 MMOL/L (ref 22–30)
CREAT SERPL-SCNC: 2.03 UMOL/L (ref 0.66–1.25)
ERYTHROCYTE [DISTWIDTH] IN BLOOD BY AUTOMATED COUNT: 13.4 % (ref 11.5–14.5)
GLUCOSE SERPL-MCNC: 200 MG/DL (ref 74–106)
HCO3 BLDA-SCNC: 18 MEQ/L (ref 22–26)
HCT VFR BLD AUTO: 34.3 % (ref 42–52)
HGB BLD-MCNC: 11.7 G/DL (ref 13.5–18)
INHALED O2 CONCENTRATION: 50 %
LYMPHOCYTES NFR BLD MANUAL: 1 % (ref 20–51)
MAGNESIUM SERPL-MCNC: 2.2 MG/DL (ref 1.6–2.3)
MCH RBC QN AUTO: 34 PG (ref 27–31)
MCHC RBC AUTO-ENTMCNC: 34 G/DL (ref 33–37)
MCV RBC AUTO: 99 FL (ref 80–100)
MONOCYTES NFR BLD: 1 % (ref 1.7–9.3)
NEUTS BAND NFR BLD: 4 % (ref 0–10)
NEUTS SEG NFR BLD MANUAL: 94 % (ref 42–75.2)
PCO2 BLDA: 30.6 MMHG (ref 35–45)
PHOSPHATE SERPL-MCNC: 5.2 MG/DL (ref 2.5–4.5)
PLATELET # BLD AUTO: 252 K/MM3 (ref 130–400)
PLATELET BLD QL SMEAR: NORMAL
PMV BLD AUTO: 10.8 FL (ref 7.4–10.4)
PO2 BLDA: 90.1 MMHG (ref 80–100)
POTASSIUM SERPL-SCNC: 4 MMOL/L (ref 3.4–5)
PROT SERPL-MCNC: 5.9 GM/DL (ref 6.4–8.2)
RBC # BLD AUTO: 3.48 M/MM3 (ref 4.2–5.6)
SAO2 % BLDA: 96.8 % (ref 92–100)
SODIUM SERPL-SCNC: 134 MMOL/L (ref 137–145)

## 2020-07-24 NOTE — NUR
PT IS ON A PEEP OF 10 THEREFORE PT DOES NOT QUALIFY FOR WEANING TRIAL AT THIS
TIME. PT ON DOCUMENTED SETTINGG AND IS KENNETH WELL WITH NO DISTRESS NOTED AT THIS
TIME

## 2020-07-24 NOTE — NUR
NOTIFIED SAMIR HI OF NO URINE OUTPUT SINCE SHIFT CHANGE. NO NEW ORDERS AT
THIS TIME. PHYSICIAN STATES TO ADDRESS WITH DR VELASCO IN AM.

## 2020-07-24 NOTE — NUR
(Late Entry 07/23/20)  obtained DPOA-HC document from Dr. Torres's
office and placed on patient's chart. SW notified patient's wife that copy was
obtained. SW to continue to follow.

## 2020-07-24 NOTE — NUR
TF NOT INCREASED AS RESIDUALS GETTING HIGHER AND HAD TO PUT IT ON HOLD AT
MIDNIGHT. WILL PASS THIS ALONG TO DAY SHIFT.

## 2020-07-24 NOTE — NUR
PT RECEIVED IN PRONE POSITION.  PT STABLE AND TOLERATING WELL.  NO CHANGES AT
THIS TIME.  PLEASE SEE ADDITIONAL CHARTING.

## 2020-07-24 NOTE — NUR
PT PLACED IN PRONE POSITION AT THIS TIME.  NO ADVERSE SIDE EFFECTS.  PT
TOLERATING IT WELL AT THIS TIME.

## 2020-07-24 NOTE — NUR
NO SEDATION VACATION PERFORMED. PT WOKE UP AFTER SEDATION WAS OFF FOR ABOUT 5
MINS WHILE DRAWING SOME LABS. PT DOES FOLLOW COMMANDS.

## 2020-07-25 VITALS — OXYGEN SATURATION: 98 %

## 2020-07-25 VITALS — OXYGEN SATURATION: 100 %

## 2020-07-25 VITALS
HEART RATE: 69 BPM | OXYGEN SATURATION: 96 % | DIASTOLIC BLOOD PRESSURE: 74 MMHG | SYSTOLIC BLOOD PRESSURE: 115 MMHG | TEMPERATURE: 96.4 F

## 2020-07-25 VITALS — OXYGEN SATURATION: 97 %

## 2020-07-25 VITALS — OXYGEN SATURATION: 96 %

## 2020-07-25 VITALS — OXYGEN SATURATION: 99 %

## 2020-07-25 VITALS — OXYGEN SATURATION: 95 %

## 2020-07-25 VITALS
SYSTOLIC BLOOD PRESSURE: 112 MMHG | OXYGEN SATURATION: 100 % | DIASTOLIC BLOOD PRESSURE: 76 MMHG | TEMPERATURE: 97.6 F | HEART RATE: 69 BPM

## 2020-07-25 VITALS
DIASTOLIC BLOOD PRESSURE: 63 MMHG | HEART RATE: 69 BPM | SYSTOLIC BLOOD PRESSURE: 106 MMHG | OXYGEN SATURATION: 99 % | TEMPERATURE: 97.7 F

## 2020-07-25 VITALS
SYSTOLIC BLOOD PRESSURE: 130 MMHG | HEART RATE: 69 BPM | TEMPERATURE: 97.6 F | OXYGEN SATURATION: 100 % | DIASTOLIC BLOOD PRESSURE: 76 MMHG

## 2020-07-25 VITALS — SYSTOLIC BLOOD PRESSURE: 115 MMHG | TEMPERATURE: 96.4 F | HEART RATE: 69 BPM | DIASTOLIC BLOOD PRESSURE: 73 MMHG

## 2020-07-25 VITALS
HEART RATE: 69 BPM | SYSTOLIC BLOOD PRESSURE: 122 MMHG | DIASTOLIC BLOOD PRESSURE: 81 MMHG | OXYGEN SATURATION: 98 % | TEMPERATURE: 96.3 F

## 2020-07-25 VITALS — OXYGEN SATURATION: 94 %

## 2020-07-25 LAB
ALBUMIN SERPL-MCNC: 2.5 GM/DL (ref 3.5–5)
ALP SERPL-CCNC: 132 U/L (ref 50–136)
ALT SERPL-CCNC: 66 U/L (ref 4–49)
ANION GAP SERPL CALC-SCNC: 9 MMOL/L (ref 7–16)
AST SERPL-CCNC: 76 U/L (ref 15–37)
BASE EXCESS BLDA CALC-SCNC: -7 MMOL/L (ref -2–2)
BASOPHILS # BLD: 0 10*3/UL (ref 0–0.2)
BASOPHILS NFR BLD AUTO: 0 % (ref 0–2)
BILIRUB SERPL-MCNC: 0.7 MG/DL (ref 0–1)
BUN SERPL-MCNC: 58 MG/DL (ref 9–20)
CALCIUM SERPL-MCNC: 7.6 MG/DL (ref 8.4–10.2)
CHLORIDE SERPL-SCNC: 109 MMOL/L (ref 98–107)
CO2 BLDA-SCNC: 18.5 MMOL/L
CO2 SERPL-SCNC: 21 MMOL/L (ref 22–30)
CREAT SERPL-SCNC: 1.51 UMOL/L (ref 0.66–1.25)
EOSINOPHIL # BLD: 0 10*3/UL (ref 0–0.7)
EOSINOPHIL NFR BLD: 0 % (ref 0–4)
ERYTHROCYTE [DISTWIDTH] IN BLOOD BY AUTOMATED COUNT: 13.3 % (ref 11.5–14.5)
GLUCOSE SERPL-MCNC: 207 MG/DL (ref 74–106)
GRANULOCYTES # BLD AUTO: 91.3 % (ref 42.2–75.2)
HCO3 BLDA-SCNC: 17.5 MEQ/L (ref 22–26)
HCT VFR BLD AUTO: 36.6 % (ref 42–52)
HGB BLD-MCNC: 12.3 G/DL (ref 13.5–18)
INHALED O2 CONCENTRATION: 55 %
LYMPHOCYTES # BLD: 0.2 10*3/UL (ref 1.2–3.4)
LYMPHOCYTES NFR BLD: 2.4 % (ref 20–51)
MCH RBC QN AUTO: 33 PG (ref 27–31)
MCHC RBC AUTO-ENTMCNC: 34 G/DL (ref 33–37)
MCV RBC AUTO: 100 FL (ref 80–100)
MONOCYTES # BLD: 0.3 10*3/UL (ref 0.1–0.6)
MONOCYTES NFR BLD AUTO: 4.9 % (ref 1.7–9.3)
NEUTROPHILS # BLD: 6.3 10*3/UL (ref 1.4–6.5)
PCO2 BLDA: 32.5 MMHG (ref 35–45)
PLATELET # BLD AUTO: 254 K/MM3 (ref 130–400)
PMV BLD AUTO: 10.8 FL (ref 7.4–10.4)
PO2 BLDA: 132.9 MMHG (ref 80–100)
POTASSIUM SERPL-SCNC: 4.5 MMOL/L (ref 3.4–5)
PROT SERPL-MCNC: 5.2 GM/DL (ref 6.4–8.2)
RBC # BLD AUTO: 3.68 M/MM3 (ref 4.2–5.6)
SAO2 % BLDA: 98.4 % (ref 92–100)
SODIUM SERPL-SCNC: 138 MMOL/L (ref 137–145)

## 2020-07-25 NOTE — NUR
PT NOT AVALIABLE TO HAVE WEANING TRIAL DONE DUE TO HIGH PEEP AND FI02. PT ON
DOCUMENTED SETTINGS. KENNETH WELL WITH NO DISTRESS NOTED AT THIS TIME

## 2020-07-25 NOTE — NUR
NO SEDATION VACATION PERFORMED AS PT WOKE UP AFTER SEDATION BEING OFF FOR
ABOUT 5 MINS WHILE DRAWING SOME BLOOD. PT DOES OPENS EYES AND FOLLOWS VERBAL
COMMANDS, ABLE TO LIFT HEAD WHEN HE WAS INSTRUCTED TO DO SO.

## 2020-07-25 NOTE — NUR
This am, [facility], was checking the placement of PT ET tube parikh when it
was noted that PTs right side was loosened and wet. The parikh was then
removed and the skin underneath showed excoriation with bruising and erythema.
This nurse cleaned site with NS flush and sterile 4x4. ET tube parikh was
moved to a lower position not to further irritate skin.

## 2020-07-26 VITALS — OXYGEN SATURATION: 97 %

## 2020-07-26 VITALS — OXYGEN SATURATION: 100 %

## 2020-07-26 VITALS — OXYGEN SATURATION: 98 %

## 2020-07-26 VITALS
HEART RATE: 69 BPM | OXYGEN SATURATION: 99 % | TEMPERATURE: 96.9 F | SYSTOLIC BLOOD PRESSURE: 110 MMHG | DIASTOLIC BLOOD PRESSURE: 63 MMHG

## 2020-07-26 VITALS — DIASTOLIC BLOOD PRESSURE: 65 MMHG | TEMPERATURE: 97.6 F | HEART RATE: 69 BPM | SYSTOLIC BLOOD PRESSURE: 103 MMHG

## 2020-07-26 VITALS — OXYGEN SATURATION: 99 %

## 2020-07-26 VITALS — SYSTOLIC BLOOD PRESSURE: 105 MMHG | HEART RATE: 69 BPM | DIASTOLIC BLOOD PRESSURE: 69 MMHG | TEMPERATURE: 96.2 F

## 2020-07-26 VITALS — HEART RATE: 69 BPM | TEMPERATURE: 97.6 F | DIASTOLIC BLOOD PRESSURE: 63 MMHG | SYSTOLIC BLOOD PRESSURE: 106 MMHG

## 2020-07-26 VITALS — OXYGEN SATURATION: 96 %

## 2020-07-26 VITALS — TEMPERATURE: 97.6 F | HEART RATE: 69 BPM | DIASTOLIC BLOOD PRESSURE: 73 MMHG | SYSTOLIC BLOOD PRESSURE: 112 MMHG

## 2020-07-26 VITALS — OXYGEN SATURATION: 94 %

## 2020-07-26 VITALS
TEMPERATURE: 96.3 F | OXYGEN SATURATION: 97 % | SYSTOLIC BLOOD PRESSURE: 112 MMHG | DIASTOLIC BLOOD PRESSURE: 77 MMHG | HEART RATE: 69 BPM

## 2020-07-26 VITALS — OXYGEN SATURATION: 95 %

## 2020-07-26 VITALS — OXYGEN SATURATION: 90 %

## 2020-07-26 LAB
ALBUMIN SERPL-MCNC: 2.7 GM/DL (ref 3.5–5)
ALP SERPL-CCNC: 125 U/L (ref 50–136)
ALT SERPL-CCNC: 85 U/L (ref 4–49)
ANION GAP SERPL CALC-SCNC: 6 MMOL/L (ref 7–16)
AST SERPL-CCNC: 98 U/L (ref 15–37)
BASE EXCESS BLDA CALC-SCNC: -5.5 MMOL/L (ref -2–2)
BILIRUB SERPL-MCNC: 0.7 MG/DL (ref 0–1)
BUN SERPL-MCNC: 56 MG/DL (ref 9–20)
CALCIUM SERPL-MCNC: 8 MG/DL (ref 8.4–10.2)
CHLORIDE SERPL-SCNC: 110 MMOL/L (ref 98–107)
CO2 BLDA-SCNC: 19.9 MMOL/L
CO2 SERPL-SCNC: 22 MMOL/L (ref 22–30)
CREAT SERPL-SCNC: 1.25 UMOL/L (ref 0.66–1.25)
ERYTHROCYTE [DISTWIDTH] IN BLOOD BY AUTOMATED COUNT: 13.5 % (ref 11.5–14.5)
GLUCOSE SERPL-MCNC: 167 MG/DL (ref 74–106)
HCO3 BLDA-SCNC: 18.9 MEQ/L (ref 22–26)
HCT VFR BLD AUTO: 38.2 % (ref 42–52)
HGB BLD-MCNC: 12.8 G/DL (ref 13.5–18)
INHALED O2 CONCENTRATION: 55 %
LYMPHOCYTES NFR BLD MANUAL: 5 % (ref 20–51)
MCH RBC QN AUTO: 33 PG (ref 27–31)
MCHC RBC AUTO-ENTMCNC: 34 G/DL (ref 33–37)
MCV RBC AUTO: 100 FL (ref 80–100)
METAMYELOCYTES NFR BLD MANUAL: 1 % (ref 0–0)
MONOCYTES NFR BLD: 4 % (ref 1.7–9.3)
NEUTS BAND NFR BLD: 4 % (ref 0–10)
NEUTS SEG NFR BLD MANUAL: 86 % (ref 42–75.2)
PCO2 BLDA: 33.4 MMHG (ref 35–45)
PH UR STRIP.AUTO: 5 [PH] (ref 5–8)
PLATELET # BLD AUTO: 288 K/MM3 (ref 130–400)
PLATELET BLD QL SMEAR: NORMAL
PMV BLD AUTO: 10.5 FL (ref 7.4–10.4)
PO2 BLDA: 92.2 MMHG (ref 80–100)
POTASSIUM SERPL-SCNC: 4.5 MMOL/L (ref 3.4–5)
PROT SERPL-MCNC: 5.8 GM/DL (ref 6.4–8.2)
RBC # BLD AUTO: 3.84 M/MM3 (ref 4.2–5.6)
RBC # UR STRIP.AUTO: (no result) /UL
RBC # UR: >50 /HPF
SAO2 % BLDA: 97 % (ref 92–100)
SODIUM SERPL-SCNC: 139 MMOL/L (ref 137–145)
SP GR UR STRIP.AUTO: 1.02 (ref 1–1.03)
SQUAMOUS # URNS: (no result) /HPF
UA DIPSTICK PNL UR STRIP.AUTO: (no result)
URINE PROTEIN:CREAT RATIO: 0.37 (ref 0–0.14)
URN COLLECT METHOD CLASS: (no result)
WBC # UR: (no result) /HPF

## 2020-07-26 NOTE — NUR
PT ON DOCUMENTED SETTINGS KENNETH WELL WITH NO DISTRESS NOTED AT THIS TIME. PT
DOES NOT QUALIFY FOR WEANING TRIAL AS PEEP IS STILL TOO HIGH AT THIS TIME

## 2020-07-26 NOTE — NUR
NO SEDATION VACATION PERFORMED, PT ABLE TO OPEN EYES WITH VOICE AND FOLLOWS
SIMPLE COMMAND. PT ABLE TO LIFT HIS HEAD.

## 2020-07-27 VITALS — OXYGEN SATURATION: 96 %

## 2020-07-27 VITALS — OXYGEN SATURATION: 99 %

## 2020-07-27 VITALS — OXYGEN SATURATION: 98 %

## 2020-07-27 VITALS — OXYGEN SATURATION: 95 %

## 2020-07-27 VITALS — OXYGEN SATURATION: 97 %

## 2020-07-27 VITALS
HEART RATE: 88 BPM | OXYGEN SATURATION: 98 % | SYSTOLIC BLOOD PRESSURE: 144 MMHG | DIASTOLIC BLOOD PRESSURE: 76 MMHG | TEMPERATURE: 97 F

## 2020-07-27 VITALS — OXYGEN SATURATION: 94 %

## 2020-07-27 VITALS — OXYGEN SATURATION: 91 %

## 2020-07-27 VITALS — OXYGEN SATURATION: 92 %

## 2020-07-27 VITALS — OXYGEN SATURATION: 93 %

## 2020-07-27 VITALS
DIASTOLIC BLOOD PRESSURE: 74 MMHG | TEMPERATURE: 97.8 F | SYSTOLIC BLOOD PRESSURE: 130 MMHG | HEART RATE: 78 BPM | OXYGEN SATURATION: 96 %

## 2020-07-27 VITALS — OXYGEN SATURATION: 90 %

## 2020-07-27 VITALS — SYSTOLIC BLOOD PRESSURE: 105 MMHG | HEART RATE: 69 BPM | TEMPERATURE: 97.8 F | DIASTOLIC BLOOD PRESSURE: 61 MMHG

## 2020-07-27 VITALS — OXYGEN SATURATION: 89 %

## 2020-07-27 VITALS — HEART RATE: 75 BPM | SYSTOLIC BLOOD PRESSURE: 134 MMHG | DIASTOLIC BLOOD PRESSURE: 76 MMHG | TEMPERATURE: 98.7 F

## 2020-07-27 VITALS
TEMPERATURE: 97.9 F | OXYGEN SATURATION: 98 % | SYSTOLIC BLOOD PRESSURE: 111 MMHG | DIASTOLIC BLOOD PRESSURE: 71 MMHG | HEART RATE: 69 BPM

## 2020-07-27 VITALS
OXYGEN SATURATION: 96 % | DIASTOLIC BLOOD PRESSURE: 70 MMHG | TEMPERATURE: 98 F | SYSTOLIC BLOOD PRESSURE: 130 MMHG | HEART RATE: 73 BPM

## 2020-07-27 LAB
ALBUMIN SERPL-MCNC: 2.7 GM/DL (ref 3.5–5)
ALP SERPL-CCNC: 122 U/L (ref 50–136)
ALT SERPL-CCNC: 100 U/L (ref 4–49)
ANION GAP SERPL CALC-SCNC: 4 MMOL/L (ref 7–16)
AST SERPL-CCNC: 85 U/L (ref 15–37)
BASE EXCESS BLDA CALC-SCNC: -3.6 MMOL/L (ref -2–2)
BASE EXCESS BLDA CALC-SCNC: -4.2 MMOL/L (ref -2–2)
BASE EXCESS BLDA CALC-SCNC: -6.5 MMOL/L (ref -2–2)
BILIRUB SERPL-MCNC: 0.8 MG/DL (ref 0–1)
BUN SERPL-MCNC: 56 MG/DL (ref 9–20)
CALCIUM SERPL-MCNC: 8.1 MG/DL (ref 8.4–10.2)
CHLORIDE SERPL-SCNC: 112 MMOL/L (ref 98–107)
CO2 BLDA-SCNC: 17.2 MMOL/L
CO2 BLDA-SCNC: 20.2 MMOL/L
CO2 BLDA-SCNC: 20.4 MMOL/L
CO2 SERPL-SCNC: 23 MMOL/L (ref 22–30)
CREAT SERPL-SCNC: 1.14 UMOL/L (ref 0.66–1.25)
ERYTHROCYTE [DISTWIDTH] IN BLOOD BY AUTOMATED COUNT: 13.8 % (ref 11.5–14.5)
GLUCOSE SERPL-MCNC: 177 MG/DL (ref 74–106)
HCO3 BLDA-SCNC: 16.4 MEQ/L (ref 22–26)
HCO3 BLDA-SCNC: 19.3 MEQ/L (ref 22–26)
HCO3 BLDA-SCNC: 19.4 MEQ/L (ref 22–26)
HCT VFR BLD AUTO: 38.2 % (ref 42–52)
HGB BLD-MCNC: 12.7 G/DL (ref 13.5–18)
INHALED O2 CONCENTRATION: 21 %
INHALED O2 CONCENTRATION: 40 %
INHALED O2 CONCENTRATION: 40 %
LYMPHOCYTES NFR BLD MANUAL: 12 % (ref 20–51)
MACROCYTES BLD QL SMEAR: (no result)
MAGNESIUM SERPL-MCNC: 2.2 MG/DL (ref 1.6–2.3)
MCH RBC QN AUTO: 34 PG (ref 27–31)
MCHC RBC AUTO-ENTMCNC: 33 G/DL (ref 33–37)
MCV RBC AUTO: 102 FL (ref 80–100)
METAMYELOCYTES NFR BLD MANUAL: 3 % (ref 0–0)
MONOCYTES NFR BLD: 1 % (ref 1.7–9.3)
NEUTS BAND NFR BLD: 8 % (ref 0–10)
NEUTS SEG NFR BLD MANUAL: 76 % (ref 42–75.2)
PCO2 BLDA: 26.2 MMHG (ref 35–45)
PCO2 BLDA: 29.3 MMHG (ref 35–45)
PCO2 BLDA: 31.5 MMHG (ref 35–45)
PHOSPHATE SERPL-MCNC: 4.7 MG/DL (ref 2.5–4.5)
PLATELET # BLD AUTO: 297 K/MM3 (ref 130–400)
PLATELET BLD QL SMEAR: NORMAL
PMV BLD AUTO: 11 FL (ref 7.4–10.4)
PO2 BLDA: 67.3 MMHG (ref 80–100)
PO2 BLDA: 77.4 MMHG (ref 80–100)
PO2 BLDA: 79.9 MMHG (ref 80–100)
POTASSIUM SERPL-SCNC: 4.8 MMOL/L (ref 3.4–5)
PRE ALBUMIN: 38.1 MG/DL (ref 17.6–36)
PROT SERPL-MCNC: 5.7 GM/DL (ref 6.4–8.2)
RBC # BLD AUTO: 3.75 M/MM3 (ref 4.2–5.6)
SAO2 % BLDA: 93.7 % (ref 92–100)
SAO2 % BLDA: 95.3 % (ref 92–100)
SAO2 % BLDA: 95.8 % (ref 92–100)
SODIUM SERPL-SCNC: 139 MMOL/L (ref 137–145)

## 2020-07-27 NOTE — NUR
PT EXTUBATED PER DR HOGAN.  SUCTIONED ORALLY AND VIA ETT PRIOR TO EXTUBATION.
PT PLACED ON 15L OXYMASK AND WEANED TO 8L WITH NO ISSUES.  BLBS DIMINISHED AND
EQUAL.  NO STRIDOR NOTED.  NO RESPIRATORY DISTRESS.  BIPAP IS PLACED AT
BEDSIDE FOR USE IF NEEDED AND AT NIGHT.  VSS.  ABG TO BE DRAWN IN 2 HOURS POST
EXTUBATION.

## 2020-07-27 NOTE — NUR
Patient placed on sedation vacation. Propofol cut in half to 15mcg. Fentanyl
with no change as patient nods head when asked in in pain. Will continue to
monitor.

## 2020-07-27 NOTE — NUR
Rests off and on throughout afternoon. Tolerating NC oxygen well with SPO2 90%
and above throughout shift. No major assessment changes since extubation this
AM. Family provided with phone update. ID and Powell updated. No new orders at
this time.

## 2020-07-27 NOTE — NUR
Patient has had a bath and linens changed, been following commands. Resting
comfortably on the vent. Vitals have remained stable. Assessments have shown
few changes. Wound to right cheek remains clean and dry. dressing placed on
right shin. No further needs at this time. Will continue to monitor.

## 2020-07-27 NOTE — NUR
PATIENT LYING COMFORT ABLE AWAKE ALERT ASKING TO BE PULLED UP IN BED, PATIENT
REPOSITIONED TO RIGHT SIDE, PATIENT GETS SLEEPY DURING ASSESSMENT

## 2020-07-28 VITALS — OXYGEN SATURATION: 98 %

## 2020-07-28 VITALS — OXYGEN SATURATION: 93 %

## 2020-07-28 VITALS — OXYGEN SATURATION: 94 %

## 2020-07-28 VITALS — OXYGEN SATURATION: 99 %

## 2020-07-28 VITALS — OXYGEN SATURATION: 95 %

## 2020-07-28 VITALS — OXYGEN SATURATION: 96 %

## 2020-07-28 VITALS — OXYGEN SATURATION: 97 %

## 2020-07-28 VITALS — OXYGEN SATURATION: 92 %

## 2020-07-28 VITALS — OXYGEN SATURATION: 91 %

## 2020-07-28 VITALS
HEART RATE: 70 BPM | SYSTOLIC BLOOD PRESSURE: 147 MMHG | DIASTOLIC BLOOD PRESSURE: 92 MMHG | OXYGEN SATURATION: 94 % | TEMPERATURE: 97.8 F

## 2020-07-28 VITALS
TEMPERATURE: 98.2 F | SYSTOLIC BLOOD PRESSURE: 142 MMHG | OXYGEN SATURATION: 92 % | HEART RATE: 63 BPM | DIASTOLIC BLOOD PRESSURE: 90 MMHG

## 2020-07-28 VITALS — SYSTOLIC BLOOD PRESSURE: 132 MMHG | DIASTOLIC BLOOD PRESSURE: 82 MMHG | HEART RATE: 68 BPM | TEMPERATURE: 97 F

## 2020-07-28 VITALS
DIASTOLIC BLOOD PRESSURE: 68 MMHG | HEART RATE: 67 BPM | OXYGEN SATURATION: 95 % | TEMPERATURE: 98.4 F | SYSTOLIC BLOOD PRESSURE: 143 MMHG

## 2020-07-28 VITALS — OXYGEN SATURATION: 88 %

## 2020-07-28 VITALS — HEART RATE: 69 BPM | TEMPERATURE: 97 F | SYSTOLIC BLOOD PRESSURE: 127 MMHG | DIASTOLIC BLOOD PRESSURE: 75 MMHG

## 2020-07-28 VITALS — HEART RATE: 64 BPM | TEMPERATURE: 99.1 F | SYSTOLIC BLOOD PRESSURE: 134 MMHG | DIASTOLIC BLOOD PRESSURE: 72 MMHG

## 2020-07-28 VITALS — OXYGEN SATURATION: 100 %

## 2020-07-28 VITALS — OXYGEN SATURATION: 89 %

## 2020-07-28 VITALS — OXYGEN SATURATION: 90 %

## 2020-07-28 LAB
ANION GAP SERPL CALC-SCNC: 4 MMOL/L (ref 7–16)
BASE EXCESS BLDA CALC-SCNC: -2.2 MMOL/L (ref -2–2)
BASOPHILS # BLD: 0 10*3/UL (ref 0–0.2)
BASOPHILS NFR BLD AUTO: 0.1 % (ref 0–2)
BUN SERPL-MCNC: 54 MG/DL (ref 9–20)
C DIFF TOX A+B STL IA-ACNC: (no result)
C DIFF TOX A+B STL QL IA: (no result)
CALCIUM SERPL-MCNC: 8.5 MG/DL (ref 8.4–10.2)
CHLORIDE SERPL-SCNC: 112 MMOL/L (ref 98–107)
CO2 BLDA-SCNC: 20.6 MMOL/L
CO2 SERPL-SCNC: 25 MMOL/L (ref 22–30)
CREAT SERPL-SCNC: 1.29 UMOL/L (ref 0.66–1.25)
EOSINOPHIL # BLD: 0 10*3/UL (ref 0–0.7)
EOSINOPHIL NFR BLD: 0.2 % (ref 0–4)
ERYTHROCYTE [DISTWIDTH] IN BLOOD BY AUTOMATED COUNT: 13.8 % (ref 11.5–14.5)
GLUCOSE SERPL-MCNC: 113 MG/DL (ref 74–106)
GRANULOCYTES # BLD AUTO: 82.4 % (ref 42.2–75.2)
HCO3 BLDA-SCNC: 19.8 MEQ/L (ref 22–26)
HCT VFR BLD AUTO: 40.5 % (ref 42–52)
HGB BLD-MCNC: 13.6 G/DL (ref 13.5–18)
INHALED O2 CONCENTRATION: 40 %
LYMPHOCYTES # BLD: 0.5 10*3/UL (ref 1.2–3.4)
LYMPHOCYTES NFR BLD: 5.4 % (ref 20–51)
MAGNESIUM SERPL-MCNC: 2.3 MG/DL (ref 1.6–2.3)
MCH RBC QN AUTO: 33 PG (ref 27–31)
MCHC RBC AUTO-ENTMCNC: 34 G/DL (ref 33–37)
MCV RBC AUTO: 100 FL (ref 80–100)
MONOCYTES # BLD: 0.6 10*3/UL (ref 0.1–0.6)
MONOCYTES NFR BLD AUTO: 7 % (ref 1.7–9.3)
NEUTROPHILS # BLD: 7 10*3/UL (ref 1.4–6.5)
PCO2 BLDA: 26.9 MMHG (ref 35–45)
PHOSPHATE SERPL-MCNC: 4.7 MG/DL (ref 2.5–4.5)
PLATELET # BLD AUTO: 291 K/MM3 (ref 130–400)
PMV BLD AUTO: 11 FL (ref 7.4–10.4)
PO2 BLDA: 107.6 MMHG (ref 80–100)
POTASSIUM SERPL-SCNC: 4.6 MMOL/L (ref 3.4–5)
RBC # BLD AUTO: 4.07 M/MM3 (ref 4.2–5.6)
SAO2 % BLDA: 97.8 % (ref 92–100)
SODIUM SERPL-SCNC: 142 MMOL/L (ref 137–145)

## 2020-07-28 NOTE — NUR
PICC intact right upper arm.  With sterile technique right upper arm PICC
dressing change done with insertion site cleansed with ChloraPrep 1,
chlorhexidine impregnated disc applied, skin prep, StatLock, and Tegaderm
applied.  No signs or symptoms of IV complications noted.  No concerns voiced.
 Arm wrapped with Ace to protect catheter.  Port flushed with 10 mL normal
saline with good blood return noted.

## 2020-07-28 NOTE — NUR
contacted patient's wife, Mireya to discuss discharge planning.
SW reviewed PT recommendation for Inpatient Rehab. Mireya is agreeable to have
[facility] screen patient. SW contacted Geri [facility] Director to
give referral. SW to continue to follow.

## 2020-07-28 NOTE — NUR
Dangles with PT notably weaker on Left trunk, unable to raise left arm as high
as right. Catheter tubing tugging against current cath secure location. Urine
now blood tinged. New cath secure placed to allow more tension. Wife provided
with phone update. She is concerned about patient's short answers to
questions, felt speech was slurred and this is not baseline for patient.
Hospitalist rounding at this time.

## 2020-07-29 VITALS — OXYGEN SATURATION: 94 %

## 2020-07-29 VITALS — OXYGEN SATURATION: 99 %

## 2020-07-29 VITALS — OXYGEN SATURATION: 97 %

## 2020-07-29 VITALS
DIASTOLIC BLOOD PRESSURE: 88 MMHG | SYSTOLIC BLOOD PRESSURE: 154 MMHG | HEART RATE: 68 BPM | TEMPERATURE: 98.7 F | OXYGEN SATURATION: 98 %

## 2020-07-29 VITALS — OXYGEN SATURATION: 93 %

## 2020-07-29 VITALS — OXYGEN SATURATION: 96 %

## 2020-07-29 VITALS — OXYGEN SATURATION: 98 %

## 2020-07-29 VITALS — OXYGEN SATURATION: 95 %

## 2020-07-29 VITALS — HEART RATE: 70 BPM | DIASTOLIC BLOOD PRESSURE: 98 MMHG | SYSTOLIC BLOOD PRESSURE: 152 MMHG | TEMPERATURE: 97.6 F

## 2020-07-29 VITALS — OXYGEN SATURATION: 90 %

## 2020-07-29 VITALS — OXYGEN SATURATION: 100 %

## 2020-07-29 VITALS — SYSTOLIC BLOOD PRESSURE: 144 MMHG | DIASTOLIC BLOOD PRESSURE: 66 MMHG | TEMPERATURE: 98 F | HEART RATE: 76 BPM

## 2020-07-29 VITALS — SYSTOLIC BLOOD PRESSURE: 146 MMHG | HEART RATE: 77 BPM | TEMPERATURE: 98.1 F | DIASTOLIC BLOOD PRESSURE: 86 MMHG

## 2020-07-29 VITALS — OXYGEN SATURATION: 91 %

## 2020-07-29 VITALS — DIASTOLIC BLOOD PRESSURE: 82 MMHG | SYSTOLIC BLOOD PRESSURE: 156 MMHG | HEART RATE: 69 BPM | TEMPERATURE: 98.9 F

## 2020-07-29 VITALS — TEMPERATURE: 98.1 F | SYSTOLIC BLOOD PRESSURE: 137 MMHG | DIASTOLIC BLOOD PRESSURE: 84 MMHG | HEART RATE: 69 BPM

## 2020-07-29 VITALS — DIASTOLIC BLOOD PRESSURE: 881 MMHG | SYSTOLIC BLOOD PRESSURE: 133 MMHG

## 2020-07-29 VITALS — TEMPERATURE: 97.8 F | HEART RATE: 71 BPM

## 2020-07-29 LAB
ANION GAP SERPL CALC-SCNC: 4 MMOL/L (ref 7–16)
BASE EXCESS BLDA CALC-SCNC: 0.2 MMOL/L (ref -2–2)
BUN SERPL-MCNC: 49 MG/DL (ref 9–20)
CALCIUM SERPL-MCNC: 8.9 MG/DL (ref 8.4–10.2)
CHLORIDE SERPL-SCNC: 110 MMOL/L (ref 98–107)
CO2 BLDA-SCNC: 24.2 MMOL/L
CO2 SERPL-SCNC: 27 MMOL/L (ref 22–30)
CREAT SERPL-SCNC: 1.19 UMOL/L (ref 0.66–1.25)
ERYTHROCYTE [DISTWIDTH] IN BLOOD BY AUTOMATED COUNT: 13.8 % (ref 11.5–14.5)
GLUCOSE SERPL-MCNC: 123 MG/DL (ref 74–106)
HCO3 BLDA-SCNC: 23.2 MEQ/L (ref 22–26)
HCT VFR BLD AUTO: 40.3 % (ref 42–52)
HGB BLD-MCNC: 13.1 G/DL (ref 13.5–18)
LYMPHOCYTES NFR BLD MANUAL: 9 % (ref 20–51)
MACROCYTES BLD QL SMEAR: (no result)
MAGNESIUM SERPL-MCNC: 2.3 MG/DL (ref 1.6–2.3)
MCH RBC QN AUTO: 33 PG (ref 27–31)
MCHC RBC AUTO-ENTMCNC: 33 G/DL (ref 33–37)
MCV RBC AUTO: 101 FL (ref 80–100)
METAMYELOCYTES NFR BLD MANUAL: 2 % (ref 0–0)
MONOCYTES NFR BLD: 7 % (ref 1.7–9.3)
NEUTS BAND NFR BLD: 2 % (ref 0–10)
NEUTS SEG NFR BLD MANUAL: 80 % (ref 42–75.2)
PCO2 BLDA: 33 MMHG (ref 35–45)
PHOSPHATE SERPL-MCNC: 4.4 MG/DL (ref 2.5–4.5)
PLATELET # BLD AUTO: 295 K/MM3 (ref 130–400)
PLATELET BLD QL SMEAR: NORMAL
PMV BLD AUTO: 11 FL (ref 7.4–10.4)
PO2 BLDA: 77.4 MMHG (ref 80–100)
POTASSIUM SERPL-SCNC: 4.7 MMOL/L (ref 3.4–5)
RBC # BLD AUTO: 3.99 M/MM3 (ref 4.2–5.6)
SAO2 % BLDA: 95.2 % (ref 92–100)
SODIUM SERPL-SCNC: 141 MMOL/L (ref 137–145)

## 2020-07-29 NOTE — NUR
Spoke with Dr. Powell about this patient. He says the patient is stable enough
to go to the Medical floor if okay with Hospitalist.

## 2020-07-29 NOTE — NUR
PLACED PT ON BIPAP AT 2300. AT 0040 RN CALLED STATING THAT PT WAS TAKING OFF
BIPAP NOT WANTING TO WEAR. THEREFORE, PT WAS PLACED BACK ON 02 AT 4 LPM NC
KENNETH WELL SATTING AT 97%. WILL TRY AGAIN TO PLACE PT ON BIPAP AT 0200 BREATHING
TX AS PT WILL HAVE AN ABG DONE AT 0500. WILL CONTINUE TO MONITOR AND ASSESS.

## 2020-07-29 NOTE — NUR
at patient bedside. Said ok for patient to transfer to medical floor
if hospitalist is okay with it.

## 2020-07-29 NOTE — NUR
Patient to transfer to medical floor. LUISA updated Medical Kelley MORAN who will
continue to follow.

## 2020-07-29 NOTE — NUR
Received report from VON Hinojosa. Pt is a/o to self and date but unable to state
current place and situation. Able to answer questions and follow directions.
Meds administered. Tele monitor in place. PICC to KESHAWN intact, flushed, w/o
complications, dressing CDI. Powers cath in place secure to RL, draining hazy
dark alida urine. Rectal tube in place, bag drained. Pt repositioned and
changed. Bipap at bedside. Skin tear to intergluteal cleft, w/o bleeding.
Needs met at this time. Call light within reach.

## 2020-07-30 VITALS — HEART RATE: 72 BPM | TEMPERATURE: 98 F | SYSTOLIC BLOOD PRESSURE: 144 MMHG | DIASTOLIC BLOOD PRESSURE: 70 MMHG

## 2020-07-30 VITALS — TEMPERATURE: 97.7 F | DIASTOLIC BLOOD PRESSURE: 86 MMHG | SYSTOLIC BLOOD PRESSURE: 140 MMHG | HEART RATE: 80 BPM

## 2020-07-30 VITALS — HEART RATE: 71 BPM | DIASTOLIC BLOOD PRESSURE: 68 MMHG | SYSTOLIC BLOOD PRESSURE: 144 MMHG | TEMPERATURE: 98.3 F

## 2020-07-30 VITALS — TEMPERATURE: 97.5 F | DIASTOLIC BLOOD PRESSURE: 65 MMHG | SYSTOLIC BLOOD PRESSURE: 127 MMHG | HEART RATE: 59 BPM

## 2020-07-30 VITALS — SYSTOLIC BLOOD PRESSURE: 147 MMHG | HEART RATE: 74 BPM | DIASTOLIC BLOOD PRESSURE: 67 MMHG | TEMPERATURE: 98.1 F

## 2020-07-30 VITALS — TEMPERATURE: 98 F | SYSTOLIC BLOOD PRESSURE: 146 MMHG | HEART RATE: 70 BPM | DIASTOLIC BLOOD PRESSURE: 64 MMHG

## 2020-07-30 LAB
ANION GAP SERPL CALC-SCNC: 6 MMOL/L (ref 7–16)
BASOPHILS # BLD: 0 10*3/UL (ref 0–0.2)
BASOPHILS NFR BLD AUTO: 0.1 % (ref 0–2)
BUN SERPL-MCNC: 49 MG/DL (ref 9–20)
CALCIUM SERPL-MCNC: 9 MG/DL (ref 8.4–10.2)
CHLORIDE SERPL-SCNC: 107 MMOL/L (ref 98–107)
CO2 SERPL-SCNC: 28 MMOL/L (ref 22–30)
CREAT SERPL-SCNC: 1.35 UMOL/L (ref 0.66–1.25)
EOSINOPHIL # BLD: 0.1 10*3/UL (ref 0–0.7)
EOSINOPHIL NFR BLD: 0.8 % (ref 0–4)
ERYTHROCYTE [DISTWIDTH] IN BLOOD BY AUTOMATED COUNT: 13.6 % (ref 11.5–14.5)
GLUCOSE SERPL-MCNC: 109 MG/DL (ref 74–106)
GRANULOCYTES # BLD AUTO: 84.3 % (ref 42.2–75.2)
HCT VFR BLD AUTO: 41.1 % (ref 42–52)
HGB BLD-MCNC: 13.5 G/DL (ref 13.5–18)
LYMPHOCYTES # BLD: 0.6 10*3/UL (ref 1.2–3.4)
LYMPHOCYTES NFR BLD: 5.5 % (ref 20–51)
MAGNESIUM SERPL-MCNC: 2.3 MG/DL (ref 1.6–2.3)
MCH RBC QN AUTO: 34 PG (ref 27–31)
MCHC RBC AUTO-ENTMCNC: 33 G/DL (ref 33–37)
MCV RBC AUTO: 102 FL (ref 80–100)
MONOCYTES # BLD: 0.9 10*3/UL (ref 0.1–0.6)
MONOCYTES NFR BLD AUTO: 8.2 % (ref 1.7–9.3)
NEUTROPHILS # BLD: 9.2 10*3/UL (ref 1.4–6.5)
PHOSPHATE SERPL-MCNC: 4.5 MG/DL (ref 2.5–4.5)
PLATELET # BLD AUTO: 290 K/MM3 (ref 130–400)
PMV BLD AUTO: 11.1 FL (ref 7.4–10.4)
POTASSIUM SERPL-SCNC: 4.2 MMOL/L (ref 3.4–5)
RBC # BLD AUTO: 4.02 M/MM3 (ref 4.2–5.6)
SODIUM SERPL-SCNC: 141 MMOL/L (ref 137–145)

## 2020-07-30 NOTE — NUR
staffed with hospitalist regarding the patient. The patient
maybe ready to discharge in a day or two. LUISA contacted Geri, [facility] Director to
provide this update. She states [facility] will be able to take the patient when he
is ready. Will continue to monitor.

## 2020-07-30 NOTE — NUR
Pt awake and alert upon entry, no C/O pain at this time, urine dark and
cloudy.  Shift assessemnts complete, left Pt call light in reach, bed in
lowest position, bed alarm on.

## 2020-07-30 NOTE — NUR
Pt resting in the room today, no C/O pain throughout. Urine continues to very
dark in color, no other issues noted.  VS have remained stable.

## 2020-07-30 NOTE — NUR
Pt wore bipap on and off throughout the night. Bed changed, pt repositioned.
Meds administered. Call light within reach. Bed alarm set.

## 2020-07-30 NOTE — NUR
contacted the patient's wife, Mireya to introduce oneself and to
follow up. Mireya is onboard for the patient to go to [facility]. Julio Cesar states she
did not get call last night to do a video conference call and would like to do
one this day. LUISA collaborated with the patient's nurse to have this call later
this day. Will continue to follow.

## 2020-07-30 NOTE — NUR
Pt assessment completed and documented. Pt resting in bed at this time. Pt
alert and oriented to self and current month/year. Pt confused as to where he
is at and current situation. Pt denies pain. PICC line to RUE CDI with good
blood return. Powers catheter to dependent drainage and is draining dark alida
urine. Rectal tube intact and draining without complications. Pt denies any
other needs at this time. Call light within reach. Bed alarm on. Will continue
to monitor.

## 2020-07-30 NOTE — NUR
RN called RT to notify that patient had taken off their BIPAP and refusing to
put back on. Placed on 3L will continue to encourage Bipap.

## 2020-07-31 ENCOUNTER — HOSPITAL ENCOUNTER (INPATIENT)
Dept: HOSPITAL 19 - IPR | Age: 70
LOS: 18 days | Discharge: HOME HEALTH | DRG: 947 | End: 2020-08-18
Attending: INTERNAL MEDICINE | Admitting: INTERNAL MEDICINE
Payer: MEDICARE

## 2020-07-31 VITALS — SYSTOLIC BLOOD PRESSURE: 164 MMHG | DIASTOLIC BLOOD PRESSURE: 75 MMHG | HEART RATE: 73 BPM | TEMPERATURE: 97.9 F

## 2020-07-31 VITALS — HEART RATE: 72 BPM | DIASTOLIC BLOOD PRESSURE: 69 MMHG | TEMPERATURE: 97.8 F | SYSTOLIC BLOOD PRESSURE: 133 MMHG

## 2020-07-31 VITALS — DIASTOLIC BLOOD PRESSURE: 67 MMHG | HEART RATE: 69 BPM | TEMPERATURE: 97.7 F | SYSTOLIC BLOOD PRESSURE: 142 MMHG

## 2020-07-31 VITALS — DIASTOLIC BLOOD PRESSURE: 79 MMHG | SYSTOLIC BLOOD PRESSURE: 141 MMHG | HEART RATE: 72 BPM | TEMPERATURE: 97.9 F

## 2020-07-31 VITALS — HEIGHT: 77.01 IN | WEIGHT: 315 LBS | BODY MASS INDEX: 37.19 KG/M2

## 2020-07-31 VITALS — SYSTOLIC BLOOD PRESSURE: 142 MMHG | TEMPERATURE: 97.6 F | DIASTOLIC BLOOD PRESSURE: 78 MMHG | HEART RATE: 68 BPM

## 2020-07-31 DIAGNOSIS — R19.7: ICD-10-CM

## 2020-07-31 DIAGNOSIS — Z86.19: ICD-10-CM

## 2020-07-31 DIAGNOSIS — I95.9: ICD-10-CM

## 2020-07-31 DIAGNOSIS — J81.1: ICD-10-CM

## 2020-07-31 DIAGNOSIS — E78.5: ICD-10-CM

## 2020-07-31 DIAGNOSIS — M10.9: ICD-10-CM

## 2020-07-31 DIAGNOSIS — R33.9: ICD-10-CM

## 2020-07-31 DIAGNOSIS — R53.81: Primary | ICD-10-CM

## 2020-07-31 DIAGNOSIS — I48.91: ICD-10-CM

## 2020-07-31 DIAGNOSIS — N17.9: ICD-10-CM

## 2020-07-31 DIAGNOSIS — Z99.81: ICD-10-CM

## 2020-07-31 DIAGNOSIS — E66.01: ICD-10-CM

## 2020-07-31 DIAGNOSIS — E87.6: ICD-10-CM

## 2020-07-31 DIAGNOSIS — G72.9: ICD-10-CM

## 2020-07-31 DIAGNOSIS — Z95.0: ICD-10-CM

## 2020-07-31 DIAGNOSIS — I21.A1: ICD-10-CM

## 2020-07-31 DIAGNOSIS — J96.01: ICD-10-CM

## 2020-07-31 DIAGNOSIS — Z85.831: ICD-10-CM

## 2020-07-31 DIAGNOSIS — G47.33: ICD-10-CM

## 2020-07-31 DIAGNOSIS — Z79.4: ICD-10-CM

## 2020-07-31 DIAGNOSIS — I10: ICD-10-CM

## 2020-07-31 DIAGNOSIS — Z79.82: ICD-10-CM

## 2020-07-31 LAB
ANION GAP SERPL CALC-SCNC: 5 MMOL/L (ref 7–16)
BASOPHILS # BLD: 0 10*3/UL (ref 0–0.2)
BASOPHILS NFR BLD AUTO: 0.1 % (ref 0–2)
BUN SERPL-MCNC: 44 MG/DL (ref 9–20)
CALCIUM SERPL-MCNC: 9 MG/DL (ref 8.4–10.2)
CHLORIDE SERPL-SCNC: 106 MMOL/L (ref 98–107)
CO2 SERPL-SCNC: 29 MMOL/L (ref 22–30)
CREAT SERPL-SCNC: 1.29 UMOL/L (ref 0.66–1.25)
EOSINOPHIL # BLD: 0.2 10*3/UL (ref 0–0.7)
EOSINOPHIL NFR BLD: 2.1 % (ref 0–4)
ERYTHROCYTE [DISTWIDTH] IN BLOOD BY AUTOMATED COUNT: 13.6 % (ref 11.5–14.5)
GLUCOSE SERPL-MCNC: 94 MG/DL (ref 74–106)
GRANULOCYTES # BLD AUTO: 81.4 % (ref 42.2–75.2)
HCT VFR BLD AUTO: 40.8 % (ref 42–52)
HGB BLD-MCNC: 13.3 G/DL (ref 13.5–18)
LYMPHOCYTES # BLD: 0.8 10*3/UL (ref 1.2–3.4)
LYMPHOCYTES NFR BLD: 8.1 % (ref 20–51)
MAGNESIUM SERPL-MCNC: 2.2 MG/DL (ref 1.6–2.3)
MCH RBC QN AUTO: 34 PG (ref 27–31)
MCHC RBC AUTO-ENTMCNC: 33 G/DL (ref 33–37)
MCV RBC AUTO: 103 FL (ref 80–100)
MONOCYTES # BLD: 0.7 10*3/UL (ref 0.1–0.6)
MONOCYTES NFR BLD AUTO: 7.4 % (ref 1.7–9.3)
NEUTROPHILS # BLD: 8.1 10*3/UL (ref 1.4–6.5)
PHOSPHATE SERPL-MCNC: 4.9 MG/DL (ref 2.5–4.5)
PLATELET # BLD AUTO: 241 K/MM3 (ref 130–400)
PMV BLD AUTO: 10.9 FL (ref 7.4–10.4)
POTASSIUM SERPL-SCNC: 4.3 MMOL/L (ref 3.4–5)
RBC # BLD AUTO: 3.96 M/MM3 (ref 4.2–5.6)
SODIUM SERPL-SCNC: 140 MMOL/L (ref 137–145)

## 2020-07-31 NOTE — NUR
Pt assessment completed and documented. Pt resting in bed at this time. Pt
alert and oriented to person, current year, place, and situation. Pt unable to
state correct month or what building he is in, but is able to state that he is
in [place]. Pt denies pain. PICC to RUE CDI. Pt requesting to get bed bath
and to get help with shaving. Bed alarm on. Call light within reach.  Will
continue to monitor.

## 2020-07-31 NOTE — NUR
Pt awake and alert upon entry, talkative, no C/O pain this morning, shift
assessments completed, left Pt call light in reach, bed in lowest position
alarm on.

## 2020-07-31 NOTE — NUR
The patient did not discharge to [facility] this day. There is a possiblity that the
patient may not be accepted by [facility]. The floor supervisors discussed options
with SW and asked that the patient's wife be contacted regarding other post
acute rehab choices for patients that are Covid positive. [facility]. SW contacted [facility] and they also take positive patients. SW contacted the patient's
wife Mireya to discuss the above information. She would like to discuss the
options with her children then make a decision. Will continue to monitor.

## 2020-07-31 NOTE — NUR
Cantrell catheter and rectal tube removed from Pt, pericare provided after
removal, 1650 ml drained from cantrell collection bag, Pt tolerated procedures
well.  Pt resting in the room, no C/O pain / discomfort at this time, VS
stable.

## 2020-07-31 NOTE — NUR
RN NOTIFIED RT THAT PATIENT HAD TAKEN BIPAP OFF AND WAS BEING COMBATIVE WITH
IT ON. PATIENT WAS PLACED ON OXYGEN AND TRIED TO PUNCH THE BIPAP. BIPAP WAS
MOVED AWAY FROM PATIENT. COLLEEN ALANIS TO MONITOR.

## 2020-07-31 NOTE — NUR
Pt has had uneventful shift. Pt attempted to get out of bed a few times
overnight to have bowel movement and to urinate. Reoriented pt and explained
to pt that he had a cantrell catheter and a rectal tube and that he did not have
to get out of bed to have BM or to urinate. Pt verablized understanding was
able to lay down and get some rest. Pt denied pain overnight. PICC to RUE CDI
with good blood return. Cantrell catheter to dependent drainage. Rectal tube
intact. Report given to VON Styles.

## 2020-07-31 NOTE — NUR
Pt given bed bath at this time per request. Linens and gown changed. All cares
provided. Pt denies any other needs. Bed alarm on. Call light within reach.

## 2020-07-31 NOTE — NUR
The patient to discharge/transition to [facility] this day, 7/31. The patient will
stay in room 303 through the weekend. Then he will transfer to a room in [facility].
SW contacted the patient's wife, Mireya to provide this update.  There are no
additional needs at this time.

## 2020-08-01 VITALS — HEART RATE: 74 BPM | SYSTOLIC BLOOD PRESSURE: 144 MMHG | DIASTOLIC BLOOD PRESSURE: 61 MMHG | TEMPERATURE: 97.9 F

## 2020-08-01 VITALS — DIASTOLIC BLOOD PRESSURE: 80 MMHG | HEART RATE: 72 BPM | SYSTOLIC BLOOD PRESSURE: 132 MMHG | TEMPERATURE: 97.8 F

## 2020-08-01 VITALS — TEMPERATURE: 97.5 F | SYSTOLIC BLOOD PRESSURE: 132 MMHG | DIASTOLIC BLOOD PRESSURE: 74 MMHG | HEART RATE: 75 BPM

## 2020-08-01 VITALS — HEART RATE: 76 BPM | SYSTOLIC BLOOD PRESSURE: 178 MMHG | DIASTOLIC BLOOD PRESSURE: 110 MMHG | TEMPERATURE: 97.5 F

## 2020-08-01 VITALS — TEMPERATURE: 97.8 F | DIASTOLIC BLOOD PRESSURE: 87 MMHG | SYSTOLIC BLOOD PRESSURE: 164 MMHG | HEART RATE: 70 BPM

## 2020-08-01 LAB
ANION GAP SERPL CALC-SCNC: 5 MMOL/L (ref 7–16)
BASOPHILS # BLD: 0 10*3/UL (ref 0–0.2)
BASOPHILS NFR BLD AUTO: 0 % (ref 0–2)
BUN SERPL-MCNC: 40 MG/DL (ref 9–20)
CALCIUM SERPL-MCNC: 8.9 MG/DL (ref 8.4–10.2)
CHLORIDE SERPL-SCNC: 105 MMOL/L (ref 98–107)
CO2 SERPL-SCNC: 29 MMOL/L (ref 22–30)
CREAT SERPL-SCNC: 1.27 UMOL/L (ref 0.66–1.25)
EOSINOPHIL # BLD: 0.1 10*3/UL (ref 0–0.7)
EOSINOPHIL NFR BLD: 1.6 % (ref 0–4)
ERYTHROCYTE [DISTWIDTH] IN BLOOD BY AUTOMATED COUNT: 13.4 % (ref 11.5–14.5)
GLUCOSE SERPL-MCNC: 90 MG/DL (ref 74–106)
GRANULOCYTES # BLD AUTO: 82.6 % (ref 42.2–75.2)
HCT VFR BLD AUTO: 39 % (ref 42–52)
HGB BLD-MCNC: 12.6 G/DL (ref 13.5–18)
LYMPHOCYTES # BLD: 0.7 10*3/UL (ref 1.2–3.4)
LYMPHOCYTES NFR BLD: 8.4 % (ref 20–51)
MAGNESIUM SERPL-MCNC: 2.2 MG/DL (ref 1.6–2.3)
MCH RBC QN AUTO: 33 PG (ref 27–31)
MCHC RBC AUTO-ENTMCNC: 32 G/DL (ref 33–37)
MCV RBC AUTO: 103 FL (ref 80–100)
MONOCYTES # BLD: 0.6 10*3/UL (ref 0.1–0.6)
MONOCYTES NFR BLD AUTO: 6.6 % (ref 1.7–9.3)
NEUTROPHILS # BLD: 7.3 10*3/UL (ref 1.4–6.5)
PHOSPHATE SERPL-MCNC: 4.4 MG/DL (ref 2.5–4.5)
PLATELET # BLD AUTO: 193 K/MM3 (ref 130–400)
PMV BLD AUTO: 11.5 FL (ref 7.4–10.4)
POTASSIUM SERPL-SCNC: 4.2 MMOL/L (ref 3.4–5)
RBC # BLD AUTO: 3.78 M/MM3 (ref 4.2–5.6)
SODIUM SERPL-SCNC: 139 MMOL/L (ref 137–145)

## 2020-08-01 NOTE — NUR
Pt assessment completed and documented. Pt resting in bed at this time. Pt
alert and oriented x4. PICC to RUE CDI with good blood return. VSS on room
air. Pt denies any other needs. Bed alarm on. Call light within reach.

## 2020-08-01 NOTE — NUR
Pt resting in the room today, no C/O pain, Pt has had several small loose
bowel movements today, Pt's buttox reddened, barrier cream being applied for
relief, VS have remained stable.

## 2020-08-01 NOTE — NUR
Pt awake and alert this morning, no C/O pain this morning, provided pericare
and bed change, shift assessments complete, left Pt call light in reach.

## 2020-08-01 NOTE — NUR
Pt had uneventful shift. States he feels as if he was able to get good sleep
last night. Received bed bath overnight. Denied pain. PICC to right upper arm
CDI. Pt denies any other needs at this time. Bed alarm on. Call light within
reach.

## 2020-08-02 VITALS — DIASTOLIC BLOOD PRESSURE: 90 MMHG | SYSTOLIC BLOOD PRESSURE: 150 MMHG | HEART RATE: 75 BPM | TEMPERATURE: 97.8 F

## 2020-08-02 VITALS — SYSTOLIC BLOOD PRESSURE: 152 MMHG | HEART RATE: 73 BPM | TEMPERATURE: 97.7 F | DIASTOLIC BLOOD PRESSURE: 88 MMHG

## 2020-08-02 VITALS — HEART RATE: 80 BPM | DIASTOLIC BLOOD PRESSURE: 90 MMHG | TEMPERATURE: 97.6 F | SYSTOLIC BLOOD PRESSURE: 165 MMHG

## 2020-08-02 VITALS — DIASTOLIC BLOOD PRESSURE: 75 MMHG | TEMPERATURE: 97.5 F | SYSTOLIC BLOOD PRESSURE: 160 MMHG | HEART RATE: 71 BPM

## 2020-08-02 VITALS — TEMPERATURE: 98 F | DIASTOLIC BLOOD PRESSURE: 80 MMHG | SYSTOLIC BLOOD PRESSURE: 148 MMHG | HEART RATE: 72 BPM

## 2020-08-02 VITALS — DIASTOLIC BLOOD PRESSURE: 68 MMHG | TEMPERATURE: 97.9 F | HEART RATE: 71 BPM | SYSTOLIC BLOOD PRESSURE: 140 MMHG

## 2020-08-02 LAB
ANION GAP SERPL CALC-SCNC: 6 MMOL/L (ref 7–16)
BUN SERPL-MCNC: 35 MG/DL (ref 9–20)
CALCIUM SERPL-MCNC: 8.8 MG/DL (ref 8.4–10.2)
CHLORIDE SERPL-SCNC: 104 MMOL/L (ref 98–107)
CO2 SERPL-SCNC: 28 MMOL/L (ref 22–30)
CREAT SERPL-SCNC: 1.31 UMOL/L (ref 0.66–1.25)
GLUCOSE SERPL-MCNC: 85 MG/DL (ref 74–106)
MAGNESIUM SERPL-MCNC: 2.1 MG/DL (ref 1.6–2.3)
PHOSPHATE SERPL-MCNC: 4.2 MG/DL (ref 2.5–4.5)
POTASSIUM SERPL-SCNC: 3.9 MMOL/L (ref 3.4–5)
SODIUM SERPL-SCNC: 139 MMOL/L (ref 137–145)

## 2020-08-02 NOTE — NUR
Pt napping upon entry, easily awakened, no C/O pain this morning, shift
assessments complete, left Pt call light in reach, bed in lowest position.

## 2020-08-02 NOTE — NUR
Pt had two episodes of incontinent diarrhea overnight in which a rectal tube
was reinserted per orders due to excoritation and pain to bilateral buttocks
when providing incontinent care. Barrier cream used on areas of excoriation.
Pt states he was able to get some sleep overnight. No complaints of pain
unless being cleaned up after incontient BM. PICC to RUE CDI. Pt denies any
other needs. Call light within reach. Bed alarm on.

## 2020-08-02 NOTE — NUR
External male catheter placed on Pt, stat lock placed on left leg, collection
bag to dependent drainage.

## 2020-08-02 NOTE — NUR
Patient removed his bipap saying it is uncomfortable. Explained to him hillary he
needs it when he's sleeping and he agreed to put it back.

## 2020-08-02 NOTE — NUR
Pt resting in the roon, no C/O pain during the day, bed change provided prior
to external cath placement, VS have remained stable.

## 2020-08-02 NOTE — NUR
Pt has had two medium incontinent, loose BMs overnight. Pts bilateral buttocks
very red and excoriated and extremely tender to touch when cleaning him up
after incontinence.  Rectal tube tube inserted at this time per orders from
JOHNATHON Juárez.

## 2020-08-02 NOTE — NUR
Received report from Jensen. Seen patient awake in bed. He is alert but partially
oriented. He asked me to open the bathroom door to see if his wife is in
there. Re-oriented him that his wife is not here. Asked him if he knows his
bday and where he is right now and he was able to answer them. With PICC on
right upper arm. Bed alarm on. Call light within reach.

## 2020-08-03 VITALS — SYSTOLIC BLOOD PRESSURE: 128 MMHG | DIASTOLIC BLOOD PRESSURE: 64 MMHG | TEMPERATURE: 97.7 F | HEART RATE: 76 BPM

## 2020-08-03 VITALS — SYSTOLIC BLOOD PRESSURE: 122 MMHG | HEART RATE: 80 BPM | DIASTOLIC BLOOD PRESSURE: 66 MMHG | TEMPERATURE: 97.3 F

## 2020-08-03 VITALS — HEART RATE: 82 BPM | SYSTOLIC BLOOD PRESSURE: 132 MMHG | DIASTOLIC BLOOD PRESSURE: 88 MMHG | TEMPERATURE: 97.6 F

## 2020-08-03 VITALS — SYSTOLIC BLOOD PRESSURE: 110 MMHG | HEART RATE: 76 BPM | DIASTOLIC BLOOD PRESSURE: 60 MMHG | TEMPERATURE: 97.6 F

## 2020-08-03 VITALS — SYSTOLIC BLOOD PRESSURE: 150 MMHG | DIASTOLIC BLOOD PRESSURE: 86 MMHG | TEMPERATURE: 97.8 F | HEART RATE: 71 BPM

## 2020-08-03 VITALS — SYSTOLIC BLOOD PRESSURE: 125 MMHG | DIASTOLIC BLOOD PRESSURE: 60 MMHG | TEMPERATURE: 98.6 F | HEART RATE: 82 BPM

## 2020-08-03 LAB
ANION GAP SERPL CALC-SCNC: 7 MMOL/L (ref 7–16)
BASOPHILS # BLD: 0 10*3/UL (ref 0–0.2)
BASOPHILS NFR BLD AUTO: 0.1 % (ref 0–2)
BUN SERPL-MCNC: 33 MG/DL (ref 9–20)
CALCIUM SERPL-MCNC: 9 MG/DL (ref 8.4–10.2)
CHLORIDE SERPL-SCNC: 105 MMOL/L (ref 98–107)
CO2 SERPL-SCNC: 28 MMOL/L (ref 22–30)
CREAT SERPL-SCNC: 1.49 UMOL/L (ref 0.66–1.25)
EOSINOPHIL # BLD: 0.1 10*3/UL (ref 0–0.7)
EOSINOPHIL NFR BLD: 1 % (ref 0–4)
ERYTHROCYTE [DISTWIDTH] IN BLOOD BY AUTOMATED COUNT: 13.2 % (ref 11.5–14.5)
GLUCOSE SERPL-MCNC: 87 MG/DL (ref 74–106)
GRANULOCYTES # BLD AUTO: 84.1 % (ref 42.2–75.2)
HCT VFR BLD AUTO: 37.6 % (ref 42–52)
HGB BLD-MCNC: 12.4 G/DL (ref 13.5–18)
LYMPHOCYTES # BLD: 0.7 10*3/UL (ref 1.2–3.4)
LYMPHOCYTES NFR BLD: 7.4 % (ref 20–51)
MAGNESIUM SERPL-MCNC: 2.2 MG/DL (ref 1.6–2.3)
MCH RBC QN AUTO: 34 PG (ref 27–31)
MCHC RBC AUTO-ENTMCNC: 33 G/DL (ref 33–37)
MCV RBC AUTO: 103 FL (ref 80–100)
MONOCYTES # BLD: 0.7 10*3/UL (ref 0.1–0.6)
MONOCYTES NFR BLD AUTO: 7.1 % (ref 1.7–9.3)
NEUTROPHILS # BLD: 7.8 10*3/UL (ref 1.4–6.5)
PHOSPHATE SERPL-MCNC: 4.9 MG/DL (ref 2.5–4.5)
PLATELET # BLD AUTO: 149 K/MM3 (ref 130–400)
PMV BLD AUTO: 12.2 FL (ref 7.4–10.4)
POTASSIUM SERPL-SCNC: 3.9 MMOL/L (ref 3.4–5)
RBC # BLD AUTO: 3.67 M/MM3 (ref 4.2–5.6)
SODIUM SERPL-SCNC: 139 MMOL/L (ref 137–145)

## 2020-08-03 NOTE — NUR
Informed patient of the procedure that I will insert a cantrell catheter as he
has a lot of urine retention. Cymraes 18 cantrell catheter used on a sterile
technique. Was able to drain 1200ml of tea colored to hematuria urine. Patient
tolerated the procedure, he fell asleep afterwards. Still on bipap. Updated
Marquita DIEGO thru phone call.

## 2020-08-03 NOTE — NUR
Perform covid19 swab today. with limited assistance patient was able to
complete oral hygiene. patient resting in bed at the moment.

## 2020-08-03 NOTE — NUR
Assessment completed. Pt lying supine, rotated to left side. Right scab noted
in middle of right cheek. PICC to right upper arm in place, blood return noted
red port, flushes easily. Purple port more difficult to flush. Pt denies pain
or shortness of breath. Powers catheter in place, urine noted to be tea-colored
with sediment. Rectal tube in place, output light brown and watery. No other
abnormal findings noted. Will conitinue to monitor. Call light in reach.

## 2020-08-03 NOTE — NUR
Patient states he doesn't have the burning feeling in his bladder anymore.
Urine output still tea colored. Denies pain.

## 2020-08-03 NOTE — NUR
Patient removed his bipap again and refuses it back. Hooked back patient to O2
at 2lpm via NC. Informed RT about it. RT said she will check back on patient
and try to convince him. Repositioned patient. Underpads changed. No urine
output in external catheter. Bladder scanner done, doesn't show he has urine
retention. Ointment applied on his groin.

## 2020-08-03 NOTE — NUR
followed up with Geri [facility] Director who advised she would like
to accept referral, but per Administration patient would need to be COVID
negative to discharge to [facility]. SW contacted Erika at [facility] and faxed
referral. SW contacted patient's wife Mireya to provide update. LUISA will
continue to follow.

## 2020-08-04 VITALS — DIASTOLIC BLOOD PRESSURE: 78 MMHG | TEMPERATURE: 98 F | SYSTOLIC BLOOD PRESSURE: 134 MMHG | HEART RATE: 75 BPM

## 2020-08-04 VITALS — HEART RATE: 84 BPM | SYSTOLIC BLOOD PRESSURE: 121 MMHG | TEMPERATURE: 98.1 F | DIASTOLIC BLOOD PRESSURE: 54 MMHG

## 2020-08-04 VITALS — SYSTOLIC BLOOD PRESSURE: 152 MMHG | DIASTOLIC BLOOD PRESSURE: 110 MMHG | TEMPERATURE: 97.9 F | HEART RATE: 85 BPM

## 2020-08-04 VITALS — DIASTOLIC BLOOD PRESSURE: 68 MMHG | SYSTOLIC BLOOD PRESSURE: 132 MMHG | HEART RATE: 78 BPM | TEMPERATURE: 98.2 F

## 2020-08-04 VITALS — DIASTOLIC BLOOD PRESSURE: 76 MMHG | TEMPERATURE: 98.2 F | HEART RATE: 67 BPM | SYSTOLIC BLOOD PRESSURE: 142 MMHG

## 2020-08-04 LAB
ANION GAP SERPL CALC-SCNC: 5 MMOL/L (ref 7–16)
BASOPHILS # BLD: 0 10*3/UL (ref 0–0.2)
BASOPHILS NFR BLD AUTO: 0.1 % (ref 0–2)
BUN SERPL-MCNC: 29 MG/DL (ref 9–20)
CALCIUM SERPL-MCNC: 8.8 MG/DL (ref 8.4–10.2)
CHLORIDE SERPL-SCNC: 103 MMOL/L (ref 98–107)
CO2 SERPL-SCNC: 29 MMOL/L (ref 22–30)
CREAT SERPL-SCNC: 1.44 UMOL/L (ref 0.66–1.25)
EOSINOPHIL # BLD: 0.1 10*3/UL (ref 0–0.7)
EOSINOPHIL NFR BLD: 0.8 % (ref 0–4)
ERYTHROCYTE [DISTWIDTH] IN BLOOD BY AUTOMATED COUNT: 13.5 % (ref 11.5–14.5)
GLUCOSE SERPL-MCNC: 119 MG/DL (ref 74–106)
GRANULOCYTES # BLD AUTO: 87.3 % (ref 42.2–75.2)
HCT VFR BLD AUTO: 35.9 % (ref 42–52)
HGB BLD-MCNC: 11.8 G/DL (ref 13.5–18)
LYMPHOCYTES # BLD: 0.7 10*3/UL (ref 1.2–3.4)
LYMPHOCYTES NFR BLD: 5.5 % (ref 20–51)
MCH RBC QN AUTO: 34 PG (ref 27–31)
MCHC RBC AUTO-ENTMCNC: 33 G/DL (ref 33–37)
MCV RBC AUTO: 103 FL (ref 80–100)
MONOCYTES # BLD: 0.7 10*3/UL (ref 0.1–0.6)
MONOCYTES NFR BLD AUTO: 6 % (ref 1.7–9.3)
NEUTROPHILS # BLD: 10.7 10*3/UL (ref 1.4–6.5)
PLATELET # BLD AUTO: 131 K/MM3 (ref 130–400)
PMV BLD AUTO: 12 FL (ref 7.4–10.4)
POTASSIUM SERPL-SCNC: 4 MMOL/L (ref 3.4–5)
RBC # BLD AUTO: 3.49 M/MM3 (ref 4.2–5.6)
SODIUM SERPL-SCNC: 137 MMOL/L (ref 137–145)

## 2020-08-04 NOTE — NUR
PT REFUSING TO WEAR. COMPLAINS MASK IS TOO MUCH AND PT CANNOT WEAR THE REST OF
THE NIGHT. WILL CONTINUE TO MONITOR AND TRY TO WEAR CPAP AGAIN AFTER 0200 MDI
TREATMENT. NO DISTRESS NOTED AT THIS TIME

## 2020-08-04 NOTE — NUR
Pt had uneventful shift. Rested in bed throughout shift. Pt refused CPAP
during night, oxygen saturations remained in the mid-90s. Potassium replaced
per protocol due to potassium level of 3.9. Pt has denied pain or other needs
throughout shift.

## 2020-08-04 NOTE — NUR
was notified by Geri, [facility] Director that patient to discharge to
[facility] today. LUISA contacted patient's wife, Mireya to provide update. Mireya
states this is great news and she is in agreement with discharge plan. LUISA
[facility]. No additional needs at this time.

## 2020-08-04 NOTE — NUR
Rectal tube removed for patient. Pt cleaned up from watery stools. Transferred
to [facility], update called to wife.

## 2020-08-04 NOTE — NUR
PICC intact right upper arm.  With sterile techn with insertion site cleans 1,
chlorhexidine impregnated disc, skin prep, StatLock, and Tegaderm applied.
Both caps changed and ports flushed with 10ml normal saline with good blood
return noted. no signs or symptoms of IV complications noted. no concerns
voiced. plans is to [facility] today. re-wrapped with ace to protect catheter.

## 2020-08-04 NOTE — NUR
Pt assessment complete. Pt is sitting up in bed eating breakfast upon entry,
he is A/O x4. His breathing is even and unlabored on RA. Pt denies any SOB. He
denies pain at this time. Denies an upset stomach, he continues with the
rectal tube, watery stools. Powers DD, sediment and tea colored urine. Pt
repositioned in bed by aide. Bacitracin applied to R cheek. No needs at this
time. Call light within reach.

## 2020-08-05 VITALS — DIASTOLIC BLOOD PRESSURE: 63 MMHG | TEMPERATURE: 98.1 F | SYSTOLIC BLOOD PRESSURE: 123 MMHG | HEART RATE: 79 BPM

## 2020-08-05 VITALS — DIASTOLIC BLOOD PRESSURE: 56 MMHG | SYSTOLIC BLOOD PRESSURE: 115 MMHG | HEART RATE: 75 BPM | TEMPERATURE: 97.4 F

## 2020-08-05 LAB
PH UR STRIP.AUTO: 5 [PH] (ref 5–8)
RBC # UR STRIP.AUTO: (no result) /UL
RBC # UR: >50 /HPF
SP GR UR STRIP.AUTO: 1.02 (ref 1–1.03)
SQUAMOUS # URNS: (no result) /HPF
UA DIPSTICK PNL UR STRIP.AUTO: (no result)
URN COLLECT METHOD CLASS: (no result)

## 2020-08-05 NOTE — NUR
Patient resting in bed and bottom was observed seeing some excoriation to his
buttocks.  Some redness, but minor in the groin area.  Applied zinc.  Catheter
care was completed.  Patient tolerated sell.  Right pannus area was cleaned
and desenex powder applied.  Patient denies pain at this time.  Will continue
to monitor.

## 2020-08-05 NOTE — NUR
PATIENT SLEEPING, DOES NOT WAKE WHEN ROOM ENTERED BY STAFF. CPAP ON. BREATHING
NONLABORED AND EVEN. BED ALARM ON.

## 2020-08-05 NOTE — NUR
PT DOES NOT LIKE CURRENT MASK ON THE HOSPITALS CPAP. THEREFORE PT WOULD LIKE
TO TRY OUR NASAL CPAP MASK AND SEE IF THAT IS ANY BETTER WHILE SLEEPING
TONIGHT.

## 2020-08-05 NOTE — NUR
Patient was seen by Dr. Lee today and he DC'd patient's ACHS blood glucose
checks and insulin orders see Dr. Lee's note.  Dr. Lee started patient on
Flomax and he also talked with Dr. Gutierrez.  No changes at this time.  Dr. Lee also changed patient's diet to AHA and no fluid restrictions.  Patient
currently resting in bed, call light in reach and bed alarm set.  Will
continue to monitor.

## 2020-08-06 VITALS — HEART RATE: 70 BPM | TEMPERATURE: 97.9 F | DIASTOLIC BLOOD PRESSURE: 49 MMHG | SYSTOLIC BLOOD PRESSURE: 121 MMHG

## 2020-08-06 VITALS — DIASTOLIC BLOOD PRESSURE: 73 MMHG | TEMPERATURE: 97.7 F | HEART RATE: 75 BPM | SYSTOLIC BLOOD PRESSURE: 117 MMHG

## 2020-08-06 NOTE — NUR
met with the patient as the patient is new to [facility]. The patient
lives in [place] with his wife, Mireya # [phone]. The patient has a cane,
walker, shower chair with a back, and a CPAP. The patient's PCP is Dr. Jorge Luis Torres and the patient receives medications from [facility] with no
difficulties. The patient has advanced directives in the EMR. The patient
requested that his wife bring his CPAP. The patient's CNA contacted Mireya to
bring it in. SW presented the Team Conference note to the patient. He did not
have any questions or concerns at this time. Will continue to monitor.

## 2020-08-06 NOTE — NUR
Patient has been resting in bed this shift. No complaints of pain. Patient is
curious about scabbed area on cheeck. PICC flushed, blood return noted on both
lumens. Patient continues to have cantrell to track I and O.

## 2020-08-06 NOTE — NUR
Patient resting in bed, wife at bedside. Patient is alert and oriented,
answers questions appropriately. Patient is eating dinner presently with no
complaints of pain or needs. Call light within reach.

## 2020-08-06 NOTE — NUR
PT RESTING IN BED. MORBIDLY OBESE. WIFE VISITING. PT DENIES PAIN. ASSESSMNENT
COMPLETED. PANTS OFF. CLEANED PERIAREA, COCYCX AND CATH CARE GIVEN.  LEFT OPEN
TO AIR. COCCYX EXCORAIATED. REBA HOSE OFF.  HEELS FLOATED. CALL LIGHT IN REACH.
BED ALARM SET.

## 2020-08-07 VITALS — HEART RATE: 85 BPM | TEMPERATURE: 98.5 F | DIASTOLIC BLOOD PRESSURE: 50 MMHG | SYSTOLIC BLOOD PRESSURE: 116 MMHG

## 2020-08-07 VITALS — SYSTOLIC BLOOD PRESSURE: 100 MMHG | DIASTOLIC BLOOD PRESSURE: 58 MMHG | TEMPERATURE: 98.1 F | HEART RATE: 76 BPM

## 2020-08-07 NOTE — NUR
PT RESTING IN BED. A&O. PLEASANT. FAMILY MEMBER HERE TO VISIT. PT DENIES PAIN
AT THIS TIME. CALL HOUSE SUPERVISIOR TO GET BARIATRIC BED FOR PT.

## 2020-08-07 NOTE — NUR
PATIENT SITTING UP ON SIDE OF BED WITH WIFE IN THE ROOM AT BEDSIDE SHIFT
REPORT. BED IN LOW, CALL LIGHT WITHIN REACH, BED ALARM ON.

## 2020-08-07 NOTE — NUR
PATIENT RESTING IN BED DURING SHIFT REPORT. JUST HELPED TO PULL PANTS UP. CALL
LIGHT WITHIN REACH, BED ALARM ON, BED IN LOW.

## 2020-08-08 VITALS — TEMPERATURE: 98.2 F | HEART RATE: 71 BPM | DIASTOLIC BLOOD PRESSURE: 51 MMHG | SYSTOLIC BLOOD PRESSURE: 119 MMHG

## 2020-08-08 VITALS — TEMPERATURE: 97.7 F | HEART RATE: 71 BPM | DIASTOLIC BLOOD PRESSURE: 71 MMHG | SYSTOLIC BLOOD PRESSURE: 111 MMHG

## 2020-08-08 NOTE — NUR
Patient ate about 50% of breakfast this morning.  Currently resting in bed,
call light in reach and bed alarm set.  Will continue to monitor.

## 2020-08-08 NOTE — NUR
ASSISTED PT WITH GETTING DRESSED FOR EARLY AM PHYSICAL THERAPY. 2:1
PIVOT TRANSFERRED TO . PT TAKEN TO HALLWAY AND BARIATRIC BED EXCHANGED.
PLACED AIR MATTRESS ON NEW BED.  PT PIVOT TRANSFERRED WITH 2:1 CGA TO NEW BED.
PT RELATES MUCH BETTER FOR HIS SIZE. PROVIDED INSTRUCTIONS ON REMOTE. CALL
LIGHT IN REACH NOW. REMIND PT NOT TO GET UP WITHOUT STAFF BEING PRESENT. PT
A&OX4.  VERBALIZED UNDERSTANDING THE IMPORTANCE OF SAFETY.

## 2020-08-08 NOTE — NUR
Patient given a suppository and had a small bowel movement this afternoon.
Patient currently resting in bed, call light in reach.  Will continue to
monitor.

## 2020-08-08 NOTE — NUR
PT RESTING IN BARIATRIC BED. RELATES BED MUCH BETTER THAN REGULAR BED. PT VERY
TALL AND LARGE IN STATURE. DENIES PAIN AT THIS TIME. FLOEY DRAINING DARK TEA
COLORED URINE WITH SEDIMENT AND MUCOUS. PT HAS NO NEEDS AT THIS TIME.  CALL
LIGHT IN REACH.

## 2020-08-09 VITALS — SYSTOLIC BLOOD PRESSURE: 119 MMHG | DIASTOLIC BLOOD PRESSURE: 65 MMHG

## 2020-08-09 VITALS — SYSTOLIC BLOOD PRESSURE: 117 MMHG | TEMPERATURE: 97.9 F | DIASTOLIC BLOOD PRESSURE: 53 MMHG | HEART RATE: 73 BPM

## 2020-08-09 VITALS — HEART RATE: 67 BPM | TEMPERATURE: 98.2 F | DIASTOLIC BLOOD PRESSURE: 70 MMHG | SYSTOLIC BLOOD PRESSURE: 114 MMHG

## 2020-08-09 NOTE — NUR
Patient resting in bed, call light in reach and denies any questions at this
time.  Patient had one small bowel movement yesterday.  Will work on him
having another one today.  Patient's wife stopped by yesterday afternoon to
visit and brought him some food for supper.

## 2020-08-09 NOTE — NUR
PT RESTING IN BARIACTRIC BED. PT DENEIS ANY PAIN AT PRESENT TIME. DISCUSSED
HIS COVID EXPERIENCE. PT VERY THANKFUL. PT DENIES NEEDS. CALL LIGHT IN REACH.
BED ALARM SE.

## 2020-08-09 NOTE — NUR
Patient had a medium bowel movement today.  Patient's bottom has some
excoriation, and has a slit in his medial gluteal cleft, but does not have any
pressure sores.  Applied zinc to his bottom.  Patient did get up from bed and
rested in his wheelchair this afternoon for a few hours watching TV.  He then
transferred back to his bed with one person CGA using his walker.  Patient's
wife stopped by this afternoon and visited patient.

## 2020-08-10 VITALS — TEMPERATURE: 97.8 F | SYSTOLIC BLOOD PRESSURE: 124 MMHG | DIASTOLIC BLOOD PRESSURE: 55 MMHG | HEART RATE: 70 BPM

## 2020-08-10 VITALS — HEART RATE: 93 BPM | DIASTOLIC BLOOD PRESSURE: 50 MMHG | TEMPERATURE: 98.4 F | SYSTOLIC BLOOD PRESSURE: 113 MMHG

## 2020-08-10 LAB
ALBUMIN SERPL-MCNC: 3 GM/DL (ref 3.5–5)
ALP SERPL-CCNC: 115 U/L (ref 50–136)
ALT SERPL-CCNC: 33 U/L (ref 4–49)
ANION GAP SERPL CALC-SCNC: 7 MMOL/L (ref 7–16)
AST SERPL-CCNC: 32 U/L (ref 15–37)
BASOPHILS # BLD: 0 10*3/UL (ref 0–0.2)
BASOPHILS NFR BLD AUTO: 0.3 % (ref 0–2)
BILIRUB SERPL-MCNC: 0.9 MG/DL (ref 0–1)
BUN SERPL-MCNC: 10 MG/DL (ref 9–20)
CALCIUM SERPL-MCNC: 8.3 MG/DL (ref 8.4–10.2)
CHLORIDE SERPL-SCNC: 100 MMOL/L (ref 98–107)
CO2 SERPL-SCNC: 28 MMOL/L (ref 22–30)
CREAT SERPL-SCNC: 1.43 UMOL/L (ref 0.66–1.25)
EOSINOPHIL # BLD: 0.3 10*3/UL (ref 0–0.7)
EOSINOPHIL NFR BLD: 4.4 % (ref 0–4)
ERYTHROCYTE [DISTWIDTH] IN BLOOD BY AUTOMATED COUNT: 13.1 % (ref 11.5–14.5)
GLUCOSE SERPL-MCNC: 109 MG/DL (ref 74–106)
GRANULOCYTES # BLD AUTO: 75.2 % (ref 42.2–75.2)
HCT VFR BLD AUTO: 33 % (ref 42–52)
HGB BLD-MCNC: 10.8 G/DL (ref 13.5–18)
LYMPHOCYTES # BLD: 0.7 10*3/UL (ref 1.2–3.4)
LYMPHOCYTES NFR BLD: 11.6 % (ref 20–51)
MAGNESIUM SERPL-MCNC: 1.5 MG/DL (ref 1.6–2.3)
MCH RBC QN AUTO: 33 PG (ref 27–31)
MCHC RBC AUTO-ENTMCNC: 33 G/DL (ref 33–37)
MCV RBC AUTO: 100 FL (ref 80–100)
MONOCYTES # BLD: 0.5 10*3/UL (ref 0.1–0.6)
MONOCYTES NFR BLD AUTO: 8.2 % (ref 1.7–9.3)
NEUTROPHILS # BLD: 4.7 10*3/UL (ref 1.4–6.5)
PLATELET # BLD AUTO: 130 K/MM3 (ref 130–400)
PMV BLD AUTO: 10.9 FL (ref 7.4–10.4)
POTASSIUM SERPL-SCNC: 3.5 MMOL/L (ref 3.4–5)
PROT SERPL-MCNC: 6 GM/DL (ref 6.4–8.2)
RBC # BLD AUTO: 3.29 M/MM3 (ref 4.2–5.6)
SODIUM SERPL-SCNC: 136 MMOL/L (ref 137–145)

## 2020-08-10 NOTE — NUR
met with the patient to follow up from the weekend. The patient
is a bit tired but feels he is improving. His wife has been visiting him in
the evenings. There are no concerns or questions at this time.

## 2020-08-10 NOTE — NUR
PATIENT PIVOT TRANSFERRING WELL WITH X2 SCG ASSIST. PATIENT HAD LARGE FORMED
BM THIS EVENING ON BEDSIDE COMMODE, TOLERATING DC'D BARCENAS CATHETER WELL.
VOIDED 3 SEPEARTE TIMES TO AMOUNT . STAGE II PRESSURE SORE NOTE ON
COCCYX. ZINC, AQUACELL AG, AND ALLEVYN DRESSING PLACED. PATIENT VISITING WITH
IN BED WIFE AT TIME OF BEDSIDE SHIFT REPORT.

## 2020-08-10 NOTE — NUR
Assisted patient to use the urinal. He was able to get up from bed
independently. He uses the urinal and was standing with the walker. Assisted
back to bed and needed help to move his both legs. He was able to pull himself
up to the bed.He denies pan.

## 2020-08-10 NOTE — NUR
PATIENT'S BARCENAS CATHETER DISCONTINUED THIS AM. 10 MLS TAKEN FROM BALLOON.
PATIENT TOLERATED WELL, WILL CONTINUE TO MONITOR.

## 2020-08-11 VITALS — HEART RATE: 76 BPM | DIASTOLIC BLOOD PRESSURE: 52 MMHG | SYSTOLIC BLOOD PRESSURE: 140 MMHG | TEMPERATURE: 98.3 F

## 2020-08-11 VITALS — DIASTOLIC BLOOD PRESSURE: 50 MMHG | HEART RATE: 69 BPM | SYSTOLIC BLOOD PRESSURE: 118 MMHG | TEMPERATURE: 97.9 F

## 2020-08-11 NOTE — NUR
PICC Intact right upper arm with sterile dressing change done with insertion
site cleansed with chloraprep x 1, chlorhexidine impregnate disk applied, skin
prep, stat lock, and tegaderm applied. no signs or symptoms of IV
complications noted. no concerns voiced. re-wrapped with ace to protect
catheter.

## 2020-08-11 NOTE — NUR
Patient did not complain of any pain today. Voiding well today and working
well with therapy. Patient in bed during bedside shift report and visiting
with wife. Bed in low, call light within reach.

## 2020-08-11 NOTE — NUR
Assisted patient in using urinal. He used bedside commode as well for bowel
movement. While patient is still sitting in the commode, bed linens and
blankets were changed. Denies pain.

## 2020-08-12 VITALS — DIASTOLIC BLOOD PRESSURE: 58 MMHG | TEMPERATURE: 98.3 F | HEART RATE: 70 BPM | SYSTOLIC BLOOD PRESSURE: 130 MMHG

## 2020-08-12 VITALS — TEMPERATURE: 98.2 F | SYSTOLIC BLOOD PRESSURE: 115 MMHG | HEART RATE: 69 BPM | DIASTOLIC BLOOD PRESSURE: 51 MMHG

## 2020-08-12 NOTE — NUR
PT HAD BEEN SLEEPING. AWAKE NOW.  UP TO BR. VOIDED WITHOUT DIFFICULTY CLEAR
YELLOW. DENIES PAIN. IN GOOD SPIRITS. RETURNED TO BED. RT NOTIFIED EARLIER TO
CHECK CPAP FOR H20 IN TUBING. NOW WORKING PROPERLY. CALL LIGHT IN REACH. PT
READY FOR SLEEP.

## 2020-08-12 NOTE — NUR
Patient's CPAP has a bubbling sound whenever he would use it. Patient decided
not to use it for tonight because of the sound.

## 2020-08-12 NOTE — NUR
met with the patient to review the Team Conference note. SW
discussed his home walker and he states he has the appropriate walker for his
height. There are no questions or concerns at this time.

## 2020-08-12 NOTE — NUR
PATIENT EXPERIENCING DIZZINESS WHIL WORKING WITH PT. THIS WAS THE PATIENTS
SECOND EPISODE TODAY. PATIENT SITTING IN WHEELCHAIR. BP TAKEN 132/94,
RECHECKED DUE TO HIGH DIASTOLIC PRESSURE, RECHECK /54. MANUAL PRESSURE
TAKEN DUE TO DIFFERENCE IN FIRST TWO BLOOD PRESSURES. BP /62. DR. DELGADO
NOTIFIED IN TEAM MEETING. ORTHOSTATIC BLOOD PRESSURE CHECKS ORDERED BY DR. DELGADO TO BE DONE BID WITH SCHEDULED VITALS.

## 2020-08-13 VITALS — SYSTOLIC BLOOD PRESSURE: 117 MMHG | TEMPERATURE: 97.3 F | DIASTOLIC BLOOD PRESSURE: 54 MMHG | HEART RATE: 76 BPM

## 2020-08-13 VITALS — HEART RATE: 75 BPM | TEMPERATURE: 97.6 F | SYSTOLIC BLOOD PRESSURE: 137 MMHG | DIASTOLIC BLOOD PRESSURE: 54 MMHG

## 2020-08-13 VITALS — SYSTOLIC BLOOD PRESSURE: 137 MMHG | DIASTOLIC BLOOD PRESSURE: 54 MMHG

## 2020-08-13 VITALS — SYSTOLIC BLOOD PRESSURE: 130 MMHG | DIASTOLIC BLOOD PRESSURE: 64 MMHG

## 2020-08-13 LAB
ANION GAP SERPL CALC-SCNC: 8 MMOL/L (ref 7–16)
BASOPHILS # BLD: 0 10*3/UL (ref 0–0.2)
BASOPHILS NFR BLD AUTO: 0.2 % (ref 0–2)
BUN SERPL-MCNC: 14 MG/DL (ref 9–20)
CALCIUM SERPL-MCNC: 8.3 MG/DL (ref 8.4–10.2)
CHLORIDE SERPL-SCNC: 100 MMOL/L (ref 98–107)
CO2 SERPL-SCNC: 29 MMOL/L (ref 22–30)
CREAT SERPL-SCNC: 1.6 UMOL/L (ref 0.66–1.25)
EOSINOPHIL # BLD: 0.3 10*3/UL (ref 0–0.7)
EOSINOPHIL NFR BLD: 5.5 % (ref 0–4)
ERYTHROCYTE [DISTWIDTH] IN BLOOD BY AUTOMATED COUNT: 13.1 % (ref 11.5–14.5)
GLUCOSE SERPL-MCNC: 99 MG/DL (ref 74–106)
GRANULOCYTES # BLD AUTO: 62.3 % (ref 42.2–75.2)
HCT VFR BLD AUTO: 31.2 % (ref 42–52)
HGB BLD-MCNC: 10.2 G/DL (ref 13.5–18)
LYMPHOCYTES # BLD: 0.9 10*3/UL (ref 1.2–3.4)
LYMPHOCYTES NFR BLD: 19.4 % (ref 20–51)
MAGNESIUM SERPL-MCNC: 1.4 MG/DL (ref 1.6–2.3)
MCH RBC QN AUTO: 33 PG (ref 27–31)
MCHC RBC AUTO-ENTMCNC: 33 G/DL (ref 33–37)
MCV RBC AUTO: 100 FL (ref 80–100)
MONOCYTES # BLD: 0.6 10*3/UL (ref 0.1–0.6)
MONOCYTES NFR BLD AUTO: 12.4 % (ref 1.7–9.3)
NEUTROPHILS # BLD: 2.9 10*3/UL (ref 1.4–6.5)
PLATELET # BLD AUTO: 192 K/MM3 (ref 130–400)
PMV BLD AUTO: 10.4 FL (ref 7.4–10.4)
POTASSIUM SERPL-SCNC: 3.4 MMOL/L (ref 3.4–5)
RBC # BLD AUTO: 3.11 M/MM3 (ref 4.2–5.6)
SODIUM SERPL-SCNC: 136 MMOL/L (ref 137–145)

## 2020-08-13 NOTE — NUR
Patient was seen by Dr. Lee.  Received new orders to hold Bumex at this
time.  Dr. Lee asked to have staff push fluids.  Patient stayed very
hydrated this afternoon.  Patient denied any questions at this time.  His wife
stopped by this afternoon to visit him.

## 2020-08-13 NOTE — NUR
Patient working with therapy this morning.  Patient reporting dizziness when
standing and Orthostatic BPs were taken this morning.  Will continue to
monitor.  Patient denies pain at this time.

## 2020-08-13 NOTE — NUR
A Family Conference was conducted with pt & pt's wife.  Also present was PT,
OT, & Rehab Director.  Rehab Director started by explaining the purpose of the
meeting.  The therapists explained how pt has been functioning & making good
steady progress; however, also expressed concerns of pt d/c'ing too soon due
to fatigue & dizziness.  Informed them of d/c set for Tuesday, 8/18/20, w/
recommendation for outpatient PT.  Wife & pt inquired about home health & told
them that would be possible.  They asked questions which team answered.  Pt &
wife are pleased with progress pt has been making.

## 2020-08-14 VITALS — SYSTOLIC BLOOD PRESSURE: 117 MMHG | DIASTOLIC BLOOD PRESSURE: 50 MMHG

## 2020-08-14 VITALS — DIASTOLIC BLOOD PRESSURE: 53 MMHG | SYSTOLIC BLOOD PRESSURE: 114 MMHG

## 2020-08-14 VITALS — SYSTOLIC BLOOD PRESSURE: 108 MMHG | DIASTOLIC BLOOD PRESSURE: 53 MMHG

## 2020-08-14 VITALS — DIASTOLIC BLOOD PRESSURE: 48 MMHG | SYSTOLIC BLOOD PRESSURE: 94 MMHG

## 2020-08-14 VITALS — SYSTOLIC BLOOD PRESSURE: 114 MMHG | DIASTOLIC BLOOD PRESSURE: 53 MMHG | TEMPERATURE: 97.8 F | HEART RATE: 70 BPM

## 2020-08-14 VITALS — TEMPERATURE: 97.9 F | HEART RATE: 77 BPM | SYSTOLIC BLOOD PRESSURE: 117 MMHG | DIASTOLIC BLOOD PRESSURE: 50 MMHG

## 2020-08-14 NOTE — NUR
Pt currently sitting up in bed. Wife is at bediside. Pt has his call light
within reach and his bed is in lowest position.

## 2020-08-14 NOTE — NUR
PATIENT HAS DENIED PAIN TODAY. WORKED WELL WITH THERAPY AND SHOWERING TODAY.
PATIENT REPORTS BURNING SENSATION TO END OF URETHRA WHEN URINATING. SITE IS
SLIGHTLY REDENNED BUT BLANCHABLE. ZINC CREAM APPLIED. SLIT TO GLUTEAL CLEFT IS
CLOSED, ZINC CREAM APPLIED AS PRECAUTION. PATIENT RESTING IN BED AFTER THERAPY
TODAY. CALL LIGHT WITHIN REACH AND BED IN LOW.

## 2020-08-14 NOTE — NUR
met with the patient to follow up before the weekend. The
patient has no questions or concerns at this time. He feels like is
progressing. He is ready to go home to his wife.  Will continue to follow.

## 2020-08-14 NOTE — NUR
PATIENT'S WIFE BROUGHT DINNER, PATIENT ATE ALL OF THE SANDWHICH AND HAD A
MILKSHAKE AS WELL. PATIENT DENIED PAIN THIS SHIFT. ZINC CREAM GIVEN TO PATIENT
TO SELF APPLY TO TIP OF URETHRA FOR REDNESS/BURNING. ENCOURAGED PATIENT TO
CONTINUE TO TURN SELF WHILE IN BED TO PREVENT PRESSURE ISSUES. PATIENT
VISITING WITH WIFE DURING BEDSIDE SHIFT REPORT. BED IN LOW, AND CALL LIGHT
WITHIN REACH. WILL SHOW ONCOMING NURSE HOW TO WORK BED.

## 2020-08-15 VITALS — HEART RATE: 68 BPM | SYSTOLIC BLOOD PRESSURE: 124 MMHG | DIASTOLIC BLOOD PRESSURE: 59 MMHG | TEMPERATURE: 98.2 F

## 2020-08-15 VITALS — DIASTOLIC BLOOD PRESSURE: 57 MMHG | HEART RATE: 94 BPM | TEMPERATURE: 97.3 F | SYSTOLIC BLOOD PRESSURE: 121 MMHG

## 2020-08-15 VITALS — DIASTOLIC BLOOD PRESSURE: 56 MMHG | SYSTOLIC BLOOD PRESSURE: 126 MMHG

## 2020-08-15 VITALS — DIASTOLIC BLOOD PRESSURE: 59 MMHG | SYSTOLIC BLOOD PRESSURE: 124 MMHG

## 2020-08-15 NOTE — NUR
PATIENT UP WITH WALKER IN ROOM WITH NO PROBLEMS, DENIES PAIN OR ANY NEEDS.
PICC DRSG IN PLACE TO RIGHT UPPER ARM

## 2020-08-15 NOTE — NUR
PATIENT RESTING IN BED DURING CHANGE OF SHIFT REPORT RECEIVED FROM DAY SHIFT
NURSE. PATIENT UP INDEPENDENTLY IN ROOM WITH NO CONCERNS OR C/O CURRENTLY.

## 2020-08-15 NOTE — NUR
Pt called out requesting to go to the restroom. Pt slept well with his CPap
during the night. Pt ambulates well with the walker and his gait belt. Pt is
back in bed and has his call light within reach. Pt shorts were also changed
at this time. Pt did wet the front of his shorts on his way to the restroom
.Pt was cleaned up and and dried and a clean pair of shorts were put on at
this time.

## 2020-08-15 NOTE — NUR
States no dizziness today. No c/o pain. Participated in group therapy in AM.
Therapists advanced activity to independent to bathroom. Walked in halls with
walker and staff in PM.

## 2020-08-16 VITALS — DIASTOLIC BLOOD PRESSURE: 52 MMHG | SYSTOLIC BLOOD PRESSURE: 131 MMHG

## 2020-08-16 VITALS — HEART RATE: 81 BPM | DIASTOLIC BLOOD PRESSURE: 52 MMHG | TEMPERATURE: 97.9 F | SYSTOLIC BLOOD PRESSURE: 131 MMHG

## 2020-08-16 VITALS — DIASTOLIC BLOOD PRESSURE: 54 MMHG | SYSTOLIC BLOOD PRESSURE: 127 MMHG | TEMPERATURE: 97.3 F | HEART RATE: 75 BPM

## 2020-08-16 VITALS — DIASTOLIC BLOOD PRESSURE: 57 MMHG | SYSTOLIC BLOOD PRESSURE: 139 MMHG

## 2020-08-16 VITALS — DIASTOLIC BLOOD PRESSURE: 54 MMHG | SYSTOLIC BLOOD PRESSURE: 127 MMHG

## 2020-08-16 VITALS — DIASTOLIC BLOOD PRESSURE: 54 MMHG | SYSTOLIC BLOOD PRESSURE: 93 MMHG

## 2020-08-16 NOTE — NUR
Sitting on edge of bed eating breakfast. Denies pain. Feels he is getting
around room without difficulty. Is hoping to go home this week. Denies any
additional needs or concerns at this time.

## 2020-08-16 NOTE — NUR
Patient requests to ambulate in halls. Patient gait steady with use of walker
and stand by assist. Returns to room. Denies any additional needs at this
time.

## 2020-08-16 NOTE — NUR
PATIENT RESTING IN BED DURING CHANGE OF SHIFT REPORT RECEIVED FROM DAY SHIFT
NURSE. PATIENT UP INDEPENDENTLY IN ROOM WITH NO DIFFICULTIES.

## 2020-08-16 NOTE — NUR
Patient requests to ambulate in halls. Gait steady with use of walker. Patient
requests to have BP checked when back to room just to see what it is. BP
obtained and documented in record. Patient denies any additional needs or
concerns at this time.

## 2020-08-16 NOTE — NUR
CHANGE OF SHIFT REPORT GIVEN TO DAY SHIFT NURSE, DAMASO, PATIENT SLEEPING IN
BED DURING REPORT.  PATIENT UP INDEPENDENTLY IN ROOM WITH NO PROBLEMS.

## 2020-08-17 VITALS — TEMPERATURE: 97.6 F | DIASTOLIC BLOOD PRESSURE: 36 MMHG | HEART RATE: 74 BPM | SYSTOLIC BLOOD PRESSURE: 119 MMHG

## 2020-08-17 VITALS — HEART RATE: 70 BPM | TEMPERATURE: 98.2 F | DIASTOLIC BLOOD PRESSURE: 57 MMHG | SYSTOLIC BLOOD PRESSURE: 125 MMHG

## 2020-08-17 LAB
ANION GAP SERPL CALC-SCNC: 5 MMOL/L (ref 7–16)
BUN SERPL-MCNC: 12 MG/DL (ref 9–20)
CALCIUM SERPL-MCNC: 8.7 MG/DL (ref 8.4–10.2)
CHLORIDE SERPL-SCNC: 105 MMOL/L (ref 98–107)
CO2 SERPL-SCNC: 28 MMOL/L (ref 22–30)
CREAT SERPL-SCNC: 1.42 UMOL/L (ref 0.66–1.25)
GLUCOSE SERPL-MCNC: 98 MG/DL (ref 74–106)
MAGNESIUM SERPL-MCNC: 1.4 MG/DL (ref 1.6–2.3)
POTASSIUM SERPL-SCNC: 3.9 MMOL/L (ref 3.4–5)
SODIUM SERPL-SCNC: 138 MMOL/L (ref 137–145)

## 2020-08-17 NOTE — NUR
met with patient to follow up from the weekend and review
discharge plan. Patient states at this time he would like to choose Home
Health services over outpatient therapy. LUISA presented Medicare.gov list of HH
agencies to patient who selected [facility]. LUISA read IM form aloud to patient
who verbalized understanding and gave SW permission to sign on his behalf. LUISA
placed form in chart and provided copy to patient. LUISA contacted Livia at
[facility] and faxed referral. LUISA contacted patient's wife, Mireya to provide
update.  Mireya is in agreement with discharge plan.

## 2020-08-17 NOTE — NUR
Received report from VON Thakur. Pt currently sitting up in bed watching
television. Wife was at bedside but has left for the evening. Pt has his call
light within reach. I was informed in report that patient was moderate
independent with his walker in his room. I did encourge pt to call at anytime
if he needs assistance.

## 2020-08-17 NOTE — NUR
CHANGE OF SHIFT REPORT GIVEN TO DAY SHIFT NURSES, PILI. PATIENT
UP INDEPENDENTLY IN ROOM WITH NO PROBLEMS.

## 2020-08-17 NOTE — NUR
PATIENT SLEEPING, BREATHING NONLABORED AND EVEN. DOES NOT WAKE WHEN DOOR TO
ROOM IS OPENED BY STAFF.

## 2020-08-17 NOTE — NUR
Patient is modified Independent in room with wheeled walker. Patient denies
any pain, workes well with therapy, received a shower with OT today. Denies
any more pain or redness to urtheral opening. Patient visiting with wife at
time of bedside shift report. call light within reach and bed in low.

## 2020-08-18 VITALS — DIASTOLIC BLOOD PRESSURE: 51 MMHG | SYSTOLIC BLOOD PRESSURE: 138 MMHG

## 2020-08-18 VITALS — SYSTOLIC BLOOD PRESSURE: 138 MMHG | TEMPERATURE: 98 F | DIASTOLIC BLOOD PRESSURE: 51 MMHG | HEART RATE: 70 BPM

## 2020-08-18 NOTE — NUR
PATIENT'S PICC LINE REMOVED THIS AM. PATIENT RESTING IN BED AT TIME OF ASSESS,
DENIES PAIN, PATIENT MOD-I IN ROOM.

## 2020-08-18 NOTE — NUR
Pt has had a very good night. Pt was able to go to the restroom independently
tonight. Pt was able to get back to bed saftely. I did help pt with his covers
but pt was able to do everything else alone. Pt is back in bed and the last
time he called was about 0330. He has his call light within reach.

## 2020-08-18 NOTE — NUR
Discussed discharge with patient.  Awaiting Dr. Lee to complete his part of
the discharge at this time.  He was independent on eating and is independent
in his room.

## 2020-08-18 NOTE — NUR
Reported off to Aurelia RN's. Pt is currently sitting up on the side
of his bed eating breakfast. Pt had a very good night. Pt has his call light
within reach and his bed is in lowest position.

## 2020-08-18 NOTE — NUR
PATIENTS HEALTH SUMMARY, DISCHAR SUMMARY, AND HOME MEDS PRINTED AND REVIEWED
WITH PATIENT AND HIS WIFE RUBINA. STRESSED IMPORTANCE OF FOLLOW UP
APPOINTMENTS AND LABS NECESSARY BEFORE THE APPOINTMENTS. REVIEWED MEDICATIONS,
AND CALLED PRESCRIPTIONS FOR ASPIRIN 325 MG. BELONGINGS GATHERED BY WIFE
RUBINA INCLUDING VALUABLES FROM THE SAFE LIKE HIS ID. PATIENT WAS TRANSPORTED
VIA  BY [facility] FOR THE RIDE HOME. PATIENT AND WIFE
DENIED QUESTIONS. PATIENT IS DISCHARGING HOME WITH PT AND OT HOMEHEALTH.

## 2022-01-16 ENCOUNTER — HOSPITAL ENCOUNTER (EMERGENCY)
Dept: HOSPITAL 19 - COL.ER | Age: 72
LOS: 1 days | Discharge: HOME | End: 2022-01-17
Attending: PERSONAL EMERGENCY RESPONSE ATTENDANT
Payer: MEDICARE

## 2022-01-16 VITALS — HEIGHT: 77.01 IN | WEIGHT: 315 LBS | BODY MASS INDEX: 37.19 KG/M2

## 2022-01-16 VITALS — TEMPERATURE: 97.6 F

## 2022-01-16 DIAGNOSIS — Z79.899: ICD-10-CM

## 2022-01-16 DIAGNOSIS — I48.91: ICD-10-CM

## 2022-01-16 DIAGNOSIS — E78.5: ICD-10-CM

## 2022-01-16 DIAGNOSIS — Z79.01: ICD-10-CM

## 2022-01-16 DIAGNOSIS — M10.9: ICD-10-CM

## 2022-01-16 DIAGNOSIS — R55: Primary | ICD-10-CM

## 2022-01-16 DIAGNOSIS — I10: ICD-10-CM

## 2022-01-16 DIAGNOSIS — R22.0: ICD-10-CM

## 2022-01-17 VITALS — HEART RATE: 70 BPM | DIASTOLIC BLOOD PRESSURE: 66 MMHG | SYSTOLIC BLOOD PRESSURE: 101 MMHG

## 2022-01-17 LAB
ALBUMIN SERPL-MCNC: 3.1 GM/DL (ref 3.4–4.8)
ALP SERPL-CCNC: 112 U/L (ref 40–150)
ALT SERPL-CCNC: 65 U/L (ref 0–55)
ANION GAP SERPL CALC-SCNC: 14 MMOL/L (ref 7–16)
AST SERPL-CCNC: 75 U/L (ref 5–34)
BASOPHILS # BLD: 0 K/MM3 (ref 0–0.2)
BASOPHILS NFR BLD AUTO: 0.4 % (ref 0–2)
BILIRUB SERPL-MCNC: 0.4 MG/DL (ref 0.2–1.2)
BUN SERPL-MCNC: 13 MG/DL (ref 8–26)
CALCIUM SERPL-MCNC: 8.2 MG/DL (ref 8.4–10.2)
CHLORIDE SERPL-SCNC: 113 MMOL/L (ref 98–107)
CO2 SERPL-SCNC: 16 MMOL/L (ref 23–31)
CREAT SERPL-SCNC: 1.38 MG/DL (ref 0.72–1.25)
EOSINOPHIL # BLD: 0.1 K/MM3 (ref 0–0.7)
EOSINOPHIL NFR BLD: 2.7 % (ref 0–4)
ERYTHROCYTE [DISTWIDTH] IN BLOOD BY AUTOMATED COUNT: 13.9 % (ref 11.5–14.5)
GLUCOSE SERPL-MCNC: 117 MG/DL (ref 70–99)
GRANULOCYTES # BLD AUTO: 52.5 % (ref 42.2–75.2)
HCT VFR BLD AUTO: 39.7 % (ref 42–52)
HGB BLD-MCNC: 13.1 G/DL (ref 13.5–18)
LYMPHOCYTES # BLD: 1.9 K/MM3 (ref 1.2–3.4)
LYMPHOCYTES NFR BLD: 38.6 % (ref 20–51)
MCH RBC QN AUTO: 35 PG (ref 27–31)
MCHC RBC AUTO-ENTMCNC: 33 G/DL (ref 33–37)
MCV RBC AUTO: 107 FL (ref 80–100)
MONOCYTES # BLD: 0.3 K/MM3 (ref 0.1–0.6)
MONOCYTES NFR BLD AUTO: 5.6 % (ref 1.7–9.3)
NEUTROPHILS # BLD: 2.6 K/MM3 (ref 1.4–6.5)
PLATELET # BLD AUTO: 176 K/MM3 (ref 130–400)
PMV BLD AUTO: 10.2 FL (ref 7.4–10.4)
POTASSIUM SERPL-SCNC: 3.6 MMOL/L (ref 3.5–4.5)
PROT SERPL-MCNC: 5.8 GM/DL (ref 6.2–8.1)
RBC # BLD AUTO: 3.72 M/MM3 (ref 4.2–5.6)
SODIUM SERPL-SCNC: 143 MMOL/L (ref 136–145)
TROPONIN I SERPL-MCNC: 0.01 NG/ML (ref 0–0.03)

## 2023-04-26 ENCOUNTER — HOSPITAL ENCOUNTER (INPATIENT)
Dept: HOSPITAL 19 - COL.ER | Age: 73
LOS: 6 days | Discharge: NURSING, REHAB OR LONG-TERM CARE | DRG: 871 | End: 2023-05-02
Attending: INTERNAL MEDICINE | Admitting: INTERNAL MEDICINE
Payer: MEDICARE

## 2023-04-26 VITALS — HEIGHT: 77 IN | BODY MASS INDEX: 37.19 KG/M2 | WEIGHT: 315 LBS

## 2023-04-26 DIAGNOSIS — E87.20: ICD-10-CM

## 2023-04-26 DIAGNOSIS — R53.1: ICD-10-CM

## 2023-04-26 DIAGNOSIS — I50.20: ICD-10-CM

## 2023-04-26 DIAGNOSIS — N39.0: ICD-10-CM

## 2023-04-26 DIAGNOSIS — S82.402A: ICD-10-CM

## 2023-04-26 DIAGNOSIS — M10.9: ICD-10-CM

## 2023-04-26 DIAGNOSIS — E83.39: ICD-10-CM

## 2023-04-26 DIAGNOSIS — N17.9: ICD-10-CM

## 2023-04-26 DIAGNOSIS — Z87.891: ICD-10-CM

## 2023-04-26 DIAGNOSIS — Z20.822: ICD-10-CM

## 2023-04-26 DIAGNOSIS — E66.01: ICD-10-CM

## 2023-04-26 DIAGNOSIS — E78.5: ICD-10-CM

## 2023-04-26 DIAGNOSIS — I95.9: ICD-10-CM

## 2023-04-26 DIAGNOSIS — I48.91: ICD-10-CM

## 2023-04-26 DIAGNOSIS — F10.90: ICD-10-CM

## 2023-04-26 DIAGNOSIS — R65.21: ICD-10-CM

## 2023-04-26 DIAGNOSIS — Z79.01: ICD-10-CM

## 2023-04-26 DIAGNOSIS — I21.A1: ICD-10-CM

## 2023-04-26 DIAGNOSIS — A41.9: Primary | ICD-10-CM

## 2023-04-26 DIAGNOSIS — Z85.831: ICD-10-CM

## 2023-04-26 DIAGNOSIS — J96.01: ICD-10-CM

## 2023-04-26 DIAGNOSIS — I13.0: ICD-10-CM

## 2023-04-26 DIAGNOSIS — N18.9: ICD-10-CM
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
## 2023-04-26 DIAGNOSIS — G47.33: ICD-10-CM

## 2023-04-26 DIAGNOSIS — R53.81: ICD-10-CM

## 2023-04-26 DIAGNOSIS — E83.42: ICD-10-CM

## 2023-04-26 PROCEDURE — A9284 NON-ELECTRONIC SPIROMETER: HCPCS

## 2023-04-26 PROCEDURE — L1832 KO ADJ JNT POS R SUP PRE CST: HCPCS

## 2023-04-26 PROCEDURE — A9500 TC99M SESTAMIBI: HCPCS

## 2023-04-26 PROCEDURE — C1751 CATH, INF, PER/CENT/MIDLINE: HCPCS

## 2023-04-27 VITALS — OXYGEN SATURATION: 100 %

## 2023-04-27 VITALS — OXYGEN SATURATION: 97 %

## 2023-04-27 VITALS — OXYGEN SATURATION: 99 %

## 2023-04-27 VITALS — OXYGEN SATURATION: 98 %

## 2023-04-27 VITALS — OXYGEN SATURATION: 85 %

## 2023-04-27 VITALS — OXYGEN SATURATION: 96 %

## 2023-04-27 VITALS — DIASTOLIC BLOOD PRESSURE: 41 MMHG | HEART RATE: 73 BPM | TEMPERATURE: 100.8 F | SYSTOLIC BLOOD PRESSURE: 89 MMHG

## 2023-04-27 VITALS — OXYGEN SATURATION: 95 %

## 2023-04-27 VITALS
TEMPERATURE: 98.1 F | SYSTOLIC BLOOD PRESSURE: 126 MMHG | DIASTOLIC BLOOD PRESSURE: 62 MMHG | HEART RATE: 70 BPM | OXYGEN SATURATION: 99 %

## 2023-04-27 VITALS — OXYGEN SATURATION: 81 %

## 2023-04-27 VITALS
SYSTOLIC BLOOD PRESSURE: 100 MMHG | TEMPERATURE: 98.7 F | DIASTOLIC BLOOD PRESSURE: 61 MMHG | HEART RATE: 68 BPM | OXYGEN SATURATION: 100 %

## 2023-04-27 VITALS — OXYGEN SATURATION: 94 %

## 2023-04-27 VITALS — OXYGEN SATURATION: 90 %

## 2023-04-27 VITALS — OXYGEN SATURATION: 76 %

## 2023-04-27 VITALS — OXYGEN SATURATION: 86 %

## 2023-04-27 VITALS — OXYGEN SATURATION: 78 %

## 2023-04-27 VITALS — OXYGEN SATURATION: 84 %

## 2023-04-27 VITALS — OXYGEN SATURATION: 91 %

## 2023-04-27 VITALS — OXYGEN SATURATION: 68 %

## 2023-04-27 VITALS — OXYGEN SATURATION: 79 %

## 2023-04-27 VITALS — OXYGEN SATURATION: 82 %

## 2023-04-27 VITALS — OXYGEN SATURATION: 89 %

## 2023-04-27 VITALS — OXYGEN SATURATION: 93 %

## 2023-04-27 VITALS — DIASTOLIC BLOOD PRESSURE: 44 MMHG | SYSTOLIC BLOOD PRESSURE: 87 MMHG

## 2023-04-27 VITALS — OXYGEN SATURATION: 88 %

## 2023-04-27 VITALS — OXYGEN SATURATION: 92 %

## 2023-04-27 VITALS — SYSTOLIC BLOOD PRESSURE: 87 MMHG | DIASTOLIC BLOOD PRESSURE: 57 MMHG

## 2023-04-27 VITALS
OXYGEN SATURATION: 99 % | TEMPERATURE: 98.7 F | SYSTOLIC BLOOD PRESSURE: 98 MMHG | DIASTOLIC BLOOD PRESSURE: 50 MMHG | HEART RATE: 70 BPM

## 2023-04-27 VITALS — SYSTOLIC BLOOD PRESSURE: 103 MMHG | DIASTOLIC BLOOD PRESSURE: 41 MMHG

## 2023-04-27 VITALS — OXYGEN SATURATION: 83 %

## 2023-04-27 VITALS — OXYGEN SATURATION: 61 %

## 2023-04-27 VITALS — OXYGEN SATURATION: 87 %

## 2023-04-27 VITALS — DIASTOLIC BLOOD PRESSURE: 41 MMHG | SYSTOLIC BLOOD PRESSURE: 86 MMHG

## 2023-04-27 VITALS — DIASTOLIC BLOOD PRESSURE: 53 MMHG | TEMPERATURE: 98.7 F | HEART RATE: 70 BPM | SYSTOLIC BLOOD PRESSURE: 98 MMHG

## 2023-04-27 VITALS — OXYGEN SATURATION: 77 %

## 2023-04-27 VITALS — OXYGEN SATURATION: 75 %

## 2023-04-27 VITALS — DIASTOLIC BLOOD PRESSURE: 48 MMHG | SYSTOLIC BLOOD PRESSURE: 88 MMHG

## 2023-04-27 VITALS — OXYGEN SATURATION: 80 %

## 2023-04-27 LAB
ALBUMIN SERPL-MCNC: 3.4 GM/DL (ref 3.4–4.8)
ALP SERPL-CCNC: 122 U/L (ref 40–150)
ALT SERPL-CCNC: 49 U/L (ref 0–55)
ANION GAP SERPL CALC-SCNC: 10 MMOL/L (ref 7–16)
ANION GAP SERPL CALC-SCNC: 12 MMOL/L (ref 7–16)
APTT PPP: 31.1 SECONDS (ref 26–37)
AST SERPL-CCNC: 87 U/L (ref 5–34)
BASOPHILS # BLD: 0 K/MM3 (ref 0–0.2)
BASOPHILS NFR BLD AUTO: 0.1 % (ref 0–2)
BILIRUB SERPL-MCNC: 0.9 MG/DL (ref 0.2–1.2)
BUN SERPL-MCNC: 14 MG/DL (ref 8–26)
BUN SERPL-MCNC: 17 MG/DL (ref 8–26)
CALCIUM SERPL-MCNC: 7.6 MG/DL (ref 8.4–10.2)
CALCIUM SERPL-MCNC: 8.8 MG/DL (ref 8.4–10.2)
CHLORIDE SERPL-SCNC: 107 MMOL/L (ref 98–107)
CHLORIDE SERPL-SCNC: 112 MMOL/L (ref 98–107)
CO2 SERPL-SCNC: 19 MMOL/L (ref 23–31)
CO2 SERPL-SCNC: 20 MMOL/L (ref 23–31)
CREAT SERPL-SCNC: 1.36 MG/DL (ref 0.72–1.25)
CREAT SERPL-SCNC: 1.8 MG/DL (ref 0.72–1.25)
EOSINOPHIL # BLD: 0 K/MM3 (ref 0–0.7)
EOSINOPHIL NFR BLD: 0 % (ref 0–4)
ERYTHROCYTE [DISTWIDTH] IN BLOOD BY AUTOMATED COUNT: 14.4 % (ref 11.5–14.5)
ERYTHROCYTE [DISTWIDTH] IN BLOOD BY AUTOMATED COUNT: 14.8 % (ref 11.5–14.5)
GLUCOSE SERPL-MCNC: 124 MG/DL (ref 70–99)
GLUCOSE SERPL-MCNC: 138 MG/DL (ref 70–99)
GRANULOCYTES # BLD AUTO: 89.3 % (ref 42.2–75.2)
HCT VFR BLD AUTO: 33.2 % (ref 42–52)
HCT VFR BLD AUTO: 41.3 % (ref 42–52)
HGB BLD-MCNC: 11 G/DL (ref 13.5–18)
HGB BLD-MCNC: 13.9 G/DL (ref 13.5–18)
INR BLD: 1.8 (ref 0.8–3)
LIPASE SERPL-CCNC: 22 U/L (ref 8–78)
LYMPHOCYTES # BLD: 0.4 K/MM3 (ref 1.2–3.4)
LYMPHOCYTES NFR BLD MANUAL: 3 % (ref 20–51)
LYMPHOCYTES NFR BLD: 2.4 % (ref 20–51)
MAGNESIUM SERPL-MCNC: 1.5 MG/DL (ref 1.6–2.6)
MCH RBC QN AUTO: 36 PG (ref 27–31)
MCH RBC QN AUTO: 36 PG (ref 27–31)
MCHC RBC AUTO-ENTMCNC: 33 G/DL (ref 33–37)
MCHC RBC AUTO-ENTMCNC: 34 G/DL (ref 33–37)
MCV RBC AUTO: 107 FL (ref 80–100)
MCV RBC AUTO: 109 FL (ref 80–100)
METAMYELOCYTES NFR BLD MANUAL: 1 % (ref 0–0)
MONOCYTES # BLD: 1.2 K/MM3 (ref 0.1–0.6)
MONOCYTES NFR BLD AUTO: 7.6 % (ref 1.7–9.3)
MONOCYTES NFR BLD: 4 % (ref 1.7–9.3)
NEUTROPHILS # BLD: 13.8 K/MM3 (ref 1.4–6.5)
NEUTS BAND NFR BLD: 17 % (ref 0–10)
NEUTS SEG NFR BLD MANUAL: 75 % (ref 42–75.2)
PH UR STRIP.AUTO: 5 [PH] (ref 5–8.5)
PHOSPHATE SERPL-MCNC: 2.2 MG/DL (ref 2.3–4.7)
PLATELET # BLD AUTO: 122 K/MM3 (ref 130–400)
PLATELET # BLD AUTO: 141 K/MM3 (ref 130–400)
PLATELET BLD QL SMEAR: NORMAL
PMV BLD AUTO: 10.4 FL (ref 7.4–10.4)
PMV BLD AUTO: 10.6 FL (ref 7.4–10.4)
POTASSIUM SERPL-SCNC: 4.4 MMOL/L (ref 3.5–4.5)
POTASSIUM SERPL-SCNC: 4.5 MMOL/L (ref 3.5–4.5)
PROT SERPL-MCNC: 6.7 GM/DL (ref 6.2–8.1)
PROTHROMBIN TIME: 20.3 SECONDS (ref 9.7–12.8)
RBC # BLD AUTO: 3.04 M/MM3 (ref 4.2–5.6)
RBC # BLD AUTO: 3.87 M/MM3 (ref 4.2–5.6)
RBC # UR STRIP.AUTO: (no result) /UL
RBC # UR: >50 /HPF (ref 0–2)
SODIUM SERPL-SCNC: 138 MMOL/L (ref 136–145)
SODIUM SERPL-SCNC: 142 MMOL/L (ref 136–145)
SP GR UR STRIP.AUTO: 1.02 (ref 1–1.03)
SQUAMOUS # URNS: (no result) /HPF (ref 0–10)
TROPONIN I SERPL-MCNC: 0.04 NG/ML (ref 0–0.03)
UA DIPSTICK PNL UR STRIP.AUTO: (no result)
URN COLLECT METHOD CLASS: (no result)
UROBILINOGEN UR STRIP.AUTO-MCNC: 0.2 E.U/DL (ref 0.2–1)
WBC # UR: >50 /HPF (ref 0–2)

## 2023-04-27 PROCEDURE — 02HV33Z INSERTION OF INFUSION DEVICE INTO SUPERIOR VENA CAVA, PERCUTANEOUS APPROACH: ICD-10-PCS

## 2023-04-27 NOTE — NUR
SW met with patient to complete intake and discuss discharge plan. Patient
reports that he lives at home with his wife Mireya in [place]. Patient
reports that Mireya has to assist with most of his ADL's due to a past history
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 He utilizes a FWW at home to assist with mobility
that is located at bedside. Patient has no home oxygen needs. PCP is 
and he utilizes [facility] for prescriptions. Patient reports that he does have
a DPOA-HC establishedlisting his wife.

## 2023-04-27 NOTE — NUR
Received report from day shift nurse. Pt is resting in bed with call light
within reach. Rotated pt in bed for bed change with day shift nurse. Assessed
back side with day nurse. Vitals are stable at this time.

## 2023-04-27 NOTE — NUR
3664 REPORT OBTAINED FROM [facility].
1341 PT TRANSFERED TO ICU BED 4 ACCOMPANIED BY [facility]IDA RN AND PCT.
PT ADMITTED LAST NIGHT FOR UTI AND HAS BEEN HYPOTENSIVE DISPITE EFFORTS TO
CORRECT. DR. DELGADO WANTED PT MOVED TO ICU FOR MONITOR OF BP AND POSSIBLY START
LEVOPHED IF NEEDED. PT IS ALERT AND ORIENTED X4. PT SETTLED/ ORIENTED TO ROOM
AND CALL LIGHT WITHIN REACH. BP ON ARRIVAL 109/60 MAP 80.
SKIN: RED/CRACK NOTED CENTER OF PT BUTTOCKS CRACK PT DENIES PAIN. ALSO NOTED
PRESSURE SORES TO LEFT GREAT TOE AND LEFT FIRST TOE. NO DRAINAGE.

## 2023-04-28 VITALS — OXYGEN SATURATION: 98 %

## 2023-04-28 VITALS — OXYGEN SATURATION: 90 %

## 2023-04-28 VITALS
TEMPERATURE: 98.3 F | OXYGEN SATURATION: 97 % | DIASTOLIC BLOOD PRESSURE: 65 MMHG | SYSTOLIC BLOOD PRESSURE: 138 MMHG | HEART RATE: 70 BPM

## 2023-04-28 VITALS — OXYGEN SATURATION: 97 %

## 2023-04-28 VITALS — OXYGEN SATURATION: 96 %

## 2023-04-28 VITALS — OXYGEN SATURATION: 95 %

## 2023-04-28 VITALS — OXYGEN SATURATION: 94 %

## 2023-04-28 VITALS
SYSTOLIC BLOOD PRESSURE: 140 MMHG | TEMPERATURE: 98.7 F | HEART RATE: 71 BPM | DIASTOLIC BLOOD PRESSURE: 63 MMHG | OXYGEN SATURATION: 98 %

## 2023-04-28 VITALS — OXYGEN SATURATION: 92 %

## 2023-04-28 VITALS — OXYGEN SATURATION: 99 %

## 2023-04-28 VITALS — OXYGEN SATURATION: 91 %

## 2023-04-28 VITALS — OXYGEN SATURATION: 93 %

## 2023-04-28 VITALS — OXYGEN SATURATION: 100 %

## 2023-04-28 VITALS
DIASTOLIC BLOOD PRESSURE: 67 MMHG | TEMPERATURE: 98.3 F | SYSTOLIC BLOOD PRESSURE: 134 MMHG | HEART RATE: 69 BPM | OXYGEN SATURATION: 97 %

## 2023-04-28 VITALS — HEART RATE: 66 BPM | DIASTOLIC BLOOD PRESSURE: 51 MMHG | SYSTOLIC BLOOD PRESSURE: 113 MMHG | TEMPERATURE: 98.3 F

## 2023-04-28 VITALS — HEART RATE: 70 BPM | SYSTOLIC BLOOD PRESSURE: 117 MMHG | TEMPERATURE: 97.6 F | DIASTOLIC BLOOD PRESSURE: 43 MMHG

## 2023-04-28 VITALS — HEART RATE: 66 BPM | DIASTOLIC BLOOD PRESSURE: 49 MMHG | SYSTOLIC BLOOD PRESSURE: 117 MMHG | TEMPERATURE: 98.4 F

## 2023-04-28 VITALS — OXYGEN SATURATION: 87 %

## 2023-04-28 VITALS — DIASTOLIC BLOOD PRESSURE: 74 MMHG | TEMPERATURE: 98.7 F | HEART RATE: 70 BPM | SYSTOLIC BLOOD PRESSURE: 151 MMHG

## 2023-04-28 VITALS — OXYGEN SATURATION: 89 %

## 2023-04-28 VITALS — OXYGEN SATURATION: 76 %

## 2023-04-28 VITALS — DIASTOLIC BLOOD PRESSURE: 54 MMHG | SYSTOLIC BLOOD PRESSURE: 119 MMHG | HEART RATE: 71 BPM | TEMPERATURE: 97.7 F

## 2023-04-28 VITALS — OXYGEN SATURATION: 88 %

## 2023-04-28 VITALS — OXYGEN SATURATION: 83 %

## 2023-04-28 VITALS — OXYGEN SATURATION: 81 %

## 2023-04-28 VITALS — OXYGEN SATURATION: 86 %

## 2023-04-28 LAB
ALBUMIN SERPL-MCNC: 2.5 GM/DL (ref 3.4–4.8)
ALP SERPL-CCNC: 75 U/L (ref 40–150)
ALT SERPL-CCNC: 50 U/L (ref 0–55)
ANION GAP SERPL CALC-SCNC: 9 MMOL/L (ref 7–16)
AST SERPL-CCNC: 88 U/L (ref 5–34)
BILIRUB SERPL-MCNC: 0.6 MG/DL (ref 0.2–1.2)
BUN SERPL-MCNC: 25 MG/DL (ref 8–26)
CALCIUM SERPL-MCNC: 7.8 MG/DL (ref 8.4–10.2)
CHLORIDE SERPL-SCNC: 113 MMOL/L (ref 98–107)
CO2 SERPL-SCNC: 18 MMOL/L (ref 23–31)
CREAT SERPL-SCNC: 1.5 MG/DL (ref 0.72–1.25)
ERYTHROCYTE [DISTWIDTH] IN BLOOD BY AUTOMATED COUNT: 14.9 % (ref 11.5–14.5)
GLUCOSE SERPL-MCNC: 123 MG/DL (ref 70–99)
HCT VFR BLD AUTO: 29.9 % (ref 42–52)
HGB BLD-MCNC: 9.9 G/DL (ref 13.5–18)
LYMPHOCYTES NFR BLD MANUAL: 4 % (ref 20–51)
MAGNESIUM SERPL-MCNC: 1.6 MG/DL (ref 1.6–2.6)
MCH RBC QN AUTO: 36 PG (ref 27–31)
MCHC RBC AUTO-ENTMCNC: 33 G/DL (ref 33–37)
MCV RBC AUTO: 109 FL (ref 80–100)
MONOCYTES NFR BLD: 3 % (ref 1.7–9.3)
NEUTS BAND NFR BLD: 10 % (ref 0–10)
NEUTS SEG NFR BLD MANUAL: 83 % (ref 42–75.2)
PHOSPHATE SERPL-MCNC: 3.5 MG/DL (ref 2.3–4.7)
PLATELET # BLD AUTO: 114 K/MM3 (ref 130–400)
PLATELET BLD QL SMEAR: NORMAL
PMV BLD AUTO: 11.3 FL (ref 7.4–10.4)
POTASSIUM SERPL-SCNC: 3.9 MMOL/L (ref 3.5–4.5)
PROT SERPL-MCNC: 5 GM/DL (ref 6.2–8.1)
RBC # BLD AUTO: 2.74 M/MM3 (ref 4.2–5.6)
SODIUM SERPL-SCNC: 140 MMOL/L (ref 136–145)
TROPONIN I SERPL-MCNC: 0.05 NG/ML (ref 0–0.03)

## 2023-04-28 NOTE — NUR
Patient to move to the [facility] later today. Hospitalist discussed need
to possible placement post discharge to either IPR vs. SNF with both patient
and wife. Therapy has not been able to work with patient due to blood pressure
issues. Will await more information.

## 2023-04-28 NOTE — NUR
LUISA met with patient and patients wife Mireya at bedside to discuss SNF vs.IPR.
Patient verbalizes that he is hopeful he can go to IPR, but is understanding
that he may not be functional enough to qualify. Patient and patients wife are
provided the [facility].gov listing of SNF facilities. Per Mireya to review for
placement. She verbalizes her first choice would be [facility], and does
not want a referral sent to either [facility] or [facility] but is willing to
review list over the weekend.
 
Clinical referral sent to Rajwinder at [facility].

## 2023-04-28 NOTE — NUR
Reported off to VON García; patient taken to medical floor room 310 by bed.
Patient was stable and vital signs were within normal limits upon leaving the
unit. All patient belongings were sent up with patient. Patient was taken up
with fluids, magnesium, and Zosyn running.

## 2023-04-28 NOTE — NUR
Shift assessment completed at 2100. A&Ox4. On tele. Scored pain on LLE at
4/10. Refused pain med, and expressed that it is tolerable at this time. KESHAWN
PICC line with fluids at this time. REBA hose on BLE. LLE elevated. Pt denies
needs or concerns at this time. Medication given per emar. Call light within
reach.

## 2023-04-28 NOTE — NUR
Pt's vitals have been stable throughout the night. Pt follows commands and is
alert and oriented x4. Pt has an abrasion on the crack of the buttocks and
it's open to air and put barrier cream on it do to some incont. of urine. Pt
in resting in bed with the call light within reach. Will give report to day
shift nurse.

## 2023-04-28 NOTE — NUR
Pt has arrived on medical unit room 310 via bed by VON Amezcua from ICU @ approx
1310.  Pt is A&Ox4. He has IV fluids infusing to right upper arm PICC line. Pt
has his belongings and family @ bedside. Assessment performed. Respirations
are even and unlabored. Pt is on room air w/o SOA or dyspnea. Heart sounds
normal.  Bowel sounds present x4 quadrants. Abd rounded and firm. LBM approx
2-3 days ago. No N/V/D. Passing flatus w/o difficulty. Hand  strong
bilaterally.  Cap refill <3 seconds.  Pulses present +2 to radial, posterior
tibial, and dorsal pedal. +3 pitting edema present to BLE. Pt has some small
areas of scabbing to left greater and second toe. Pt reports this is d/t his
recent fall @ home that caused him to come here.  REBA hose in place. Pt
reports 4/10 aching pain to LLE, but received PRN APAP recently for this w/
minimal relief.  Pt reports pain increases to 10/10 aching/sharp when FWB. Pt
has no issues w/ urinary voiding. Pt oriented to room, call light, visiting
hours, smoking policy, and food services. No complaints or concerns at this
time.

## 2023-04-28 NOTE — NUR
Report received from VON Balderrama; patient currently resting in bed with fluids
running through his right upper arm PICC. No other lines or tubes are in place
at this time. Patient's vital signs are within normal limits this morning.

## 2023-04-29 VITALS — TEMPERATURE: 98.2 F | SYSTOLIC BLOOD PRESSURE: 130 MMHG | HEART RATE: 71 BPM | DIASTOLIC BLOOD PRESSURE: 60 MMHG

## 2023-04-29 VITALS — DIASTOLIC BLOOD PRESSURE: 50 MMHG | HEART RATE: 70 BPM | TEMPERATURE: 97.4 F | SYSTOLIC BLOOD PRESSURE: 128 MMHG

## 2023-04-29 VITALS — SYSTOLIC BLOOD PRESSURE: 147 MMHG | TEMPERATURE: 98.1 F | DIASTOLIC BLOOD PRESSURE: 42 MMHG | HEART RATE: 72 BPM

## 2023-04-29 VITALS — HEART RATE: 68 BPM | TEMPERATURE: 97.4 F | SYSTOLIC BLOOD PRESSURE: 136 MMHG | DIASTOLIC BLOOD PRESSURE: 44 MMHG

## 2023-04-29 VITALS — SYSTOLIC BLOOD PRESSURE: 131 MMHG | TEMPERATURE: 98.1 F | HEART RATE: 70 BPM | DIASTOLIC BLOOD PRESSURE: 56 MMHG

## 2023-04-29 VITALS — TEMPERATURE: 98.2 F

## 2023-04-29 LAB
ANION GAP SERPL CALC-SCNC: 8 MMOL/L (ref 7–16)
BUN SERPL-MCNC: 23 MG/DL (ref 8–26)
CALCIUM SERPL-MCNC: 7.9 MG/DL (ref 8.4–10.2)
CHLORIDE SERPL-SCNC: 114 MMOL/L (ref 98–107)
CO2 SERPL-SCNC: 18 MMOL/L (ref 23–31)
CREAT SERPL-SCNC: 1.22 MG/DL (ref 0.72–1.25)
ERYTHROCYTE [DISTWIDTH] IN BLOOD BY AUTOMATED COUNT: 14.8 % (ref 11.5–14.5)
GLUCOSE SERPL-MCNC: 124 MG/DL (ref 70–99)
HCT VFR BLD AUTO: 27.8 % (ref 42–52)
HGB BLD-MCNC: 9 G/DL (ref 13.5–18)
HYPOCHROMIA BLD QL SMEAR: (no result)
LYMPHOCYTES NFR BLD MANUAL: 10 % (ref 20–51)
MACROCYTES BLD QL SMEAR: (no result)
MCH RBC QN AUTO: 35 PG (ref 27–31)
MCHC RBC AUTO-ENTMCNC: 32 G/DL (ref 33–37)
MCV RBC AUTO: 109 FL (ref 80–100)
MONOCYTES NFR BLD: 4 % (ref 1.7–9.3)
NEUTS BAND NFR BLD: 8 % (ref 0–10)
NEUTS SEG NFR BLD MANUAL: 78 % (ref 42–75.2)
PLATELET # BLD AUTO: 114 K/MM3 (ref 130–400)
PLATELET BLD QL SMEAR: (no result)
PMV BLD AUTO: 11 FL (ref 7.4–10.4)
POTASSIUM SERPL-SCNC: 3.6 MMOL/L (ref 3.5–4.5)
RBC # BLD AUTO: 2.55 M/MM3 (ref 4.2–5.6)
SODIUM SERPL-SCNC: 140 MMOL/L (ref 136–145)

## 2023-04-29 NOTE — NUR
Agree with RAJAT García assessment of the patient. Patient A&Ox4. VSS. IV CDI.
Call light within reach. Bed alarm on

## 2023-04-29 NOTE — NUR
Pt sitting up in bed w/ spouse @ bedside. Pt reports he had a brief episode of
expiratory wheezing. This was alleviated by elevated HOB. Pt encouraged to
utilize incentive spirometer. It appears that pt does not have this @ bedside.
Will request incentive spirometer from RT. Otherwise, pt denies pain or SOA.
Respirations are even and unlabored on room air. No complaints or concerns
voiced. Call light in reach.

## 2023-04-29 NOTE — NUR
Pt sitting up in bed watching tv upon this nurse's entry. AM PO meds admin w/o
difficulty. Shift assessment performed. Respirations are even and unlabored on
room air. Pt denies any SOA or dyspnea. No cyanonis to lips or extremities.
LLE has +2 pitting edema w/ shiny tight skin.  REBA hose removed and rinsed. L
medial & lateral malleous has signifant dark purple ecchymosis present w/
edema over the medial malleoulus. RLE has +1 pitting edema. Bowel sounds are
hypoactive in all quadrants. Pt is unsure of last BM, but reports it has been
>3days. Pt reports chronic constipation. Abd is obese and firm. No pain to
palpation. Pt has no difficulty w/ urinary voiding. He denies urinary pain,
frequency, or hesitancy. No complaints or concerns voiced. Call light in
reach.  cont to use urinal. Pt denies

## 2023-04-29 NOTE — NUR
consulted regarding cardiology consult order from 04/27/23 that was
incomplete. Per , verbal order received to discontinue cardiology
consult d/t troponin levels down trending. Order verified and cardiology
consult discontinued.

## 2023-04-30 VITALS — DIASTOLIC BLOOD PRESSURE: 61 MMHG | HEART RATE: 73 BPM | SYSTOLIC BLOOD PRESSURE: 136 MMHG | TEMPERATURE: 97.7 F

## 2023-04-30 VITALS — TEMPERATURE: 98.7 F | DIASTOLIC BLOOD PRESSURE: 69 MMHG | HEART RATE: 70 BPM | SYSTOLIC BLOOD PRESSURE: 128 MMHG

## 2023-04-30 VITALS — HEART RATE: 70 BPM | TEMPERATURE: 99 F | DIASTOLIC BLOOD PRESSURE: 59 MMHG | SYSTOLIC BLOOD PRESSURE: 143 MMHG

## 2023-04-30 VITALS — HEART RATE: 70 BPM | DIASTOLIC BLOOD PRESSURE: 60 MMHG | SYSTOLIC BLOOD PRESSURE: 116 MMHG | TEMPERATURE: 98 F

## 2023-04-30 VITALS — DIASTOLIC BLOOD PRESSURE: 73 MMHG | SYSTOLIC BLOOD PRESSURE: 157 MMHG | HEART RATE: 72 BPM | TEMPERATURE: 97.5 F

## 2023-04-30 VITALS — DIASTOLIC BLOOD PRESSURE: 58 MMHG | HEART RATE: 71 BPM | TEMPERATURE: 99.6 F | SYSTOLIC BLOOD PRESSURE: 150 MMHG

## 2023-04-30 VITALS — SYSTOLIC BLOOD PRESSURE: 153 MMHG | HEART RATE: 77 BPM | TEMPERATURE: 97.7 F | DIASTOLIC BLOOD PRESSURE: 93 MMHG

## 2023-04-30 VITALS — HEART RATE: 71 BPM

## 2023-04-30 LAB
BASOPHILS # BLD: 0 K/MM3 (ref 0–0.2)
BASOPHILS NFR BLD AUTO: 0.4 % (ref 0–2)
EOSINOPHIL # BLD: 0.1 K/MM3 (ref 0–0.7)
EOSINOPHIL NFR BLD: 0.8 % (ref 0–4)
ERYTHROCYTE [DISTWIDTH] IN BLOOD BY AUTOMATED COUNT: 14.7 % (ref 11.5–14.5)
GRANULOCYTES # BLD AUTO: 81.1 % (ref 42.2–75.2)
HCT VFR BLD AUTO: 27.7 % (ref 42–52)
HGB BLD-MCNC: 9.3 G/DL (ref 13.5–18)
LYMPHOCYTES # BLD: 0.6 K/MM3 (ref 1.2–3.4)
LYMPHOCYTES NFR BLD: 7.2 % (ref 20–51)
MCH RBC QN AUTO: 36 PG (ref 27–31)
MCHC RBC AUTO-ENTMCNC: 34 G/DL (ref 33–37)
MCV RBC AUTO: 107 FL (ref 80–100)
MONOCYTES # BLD: 0.7 K/MM3 (ref 0.1–0.6)
MONOCYTES NFR BLD AUTO: 9.6 % (ref 1.7–9.3)
NEUTROPHILS # BLD: 6.2 K/MM3 (ref 1.4–6.5)
PLATELET # BLD AUTO: 131 K/MM3 (ref 130–400)
PMV BLD AUTO: 10.9 FL (ref 7.4–10.4)
RBC # BLD AUTO: 2.58 M/MM3 (ref 4.2–5.6)

## 2023-04-30 NOTE — NUR
Agree with the student nurses assessment of the patient. Patient A&Ox4. VSS.
Cpap on. IV CDI. Call light within reach.

## 2023-04-30 NOTE — NUR
NURSING SHIFT ASSESSMENT COMPLETED. THE PATIENT IS ALERT AND ORIENTED AND
DENIES PAIN OR DISCOMFORT. THE PLAN OF CARE WAS DISCUSSED. QUESTIONS AND
CONCERNS DISCUSSED. NO OTHER NEEDS AT THIS TIME. WILL MONITOR.

## 2023-05-01 VITALS — TEMPERATURE: 98.7 F | HEART RATE: 71 BPM | SYSTOLIC BLOOD PRESSURE: 154 MMHG | DIASTOLIC BLOOD PRESSURE: 57 MMHG

## 2023-05-01 VITALS — DIASTOLIC BLOOD PRESSURE: 69 MMHG | TEMPERATURE: 97.6 F | SYSTOLIC BLOOD PRESSURE: 130 MMHG | HEART RATE: 73 BPM

## 2023-05-01 VITALS — HEART RATE: 76 BPM | DIASTOLIC BLOOD PRESSURE: 85 MMHG | SYSTOLIC BLOOD PRESSURE: 181 MMHG

## 2023-05-01 VITALS — DIASTOLIC BLOOD PRESSURE: 54 MMHG | HEART RATE: 72 BPM | TEMPERATURE: 98.2 F | SYSTOLIC BLOOD PRESSURE: 128 MMHG

## 2023-05-01 VITALS — HEART RATE: 72 BPM | SYSTOLIC BLOOD PRESSURE: 163 MMHG | DIASTOLIC BLOOD PRESSURE: 56 MMHG | TEMPERATURE: 98.4 F

## 2023-05-01 VITALS — HEART RATE: 76 BPM | SYSTOLIC BLOOD PRESSURE: 179 MMHG | DIASTOLIC BLOOD PRESSURE: 83 MMHG

## 2023-05-01 VITALS — DIASTOLIC BLOOD PRESSURE: 87 MMHG | SYSTOLIC BLOOD PRESSURE: 172 MMHG | HEART RATE: 77 BPM

## 2023-05-01 VITALS — HEART RATE: 91 BPM | SYSTOLIC BLOOD PRESSURE: 159 MMHG | DIASTOLIC BLOOD PRESSURE: 96 MMHG

## 2023-05-01 VITALS — TEMPERATURE: 98.3 F | DIASTOLIC BLOOD PRESSURE: 59 MMHG | HEART RATE: 76 BPM | SYSTOLIC BLOOD PRESSURE: 155 MMHG

## 2023-05-01 VITALS — DIASTOLIC BLOOD PRESSURE: 50 MMHG | TEMPERATURE: 98.4 F | SYSTOLIC BLOOD PRESSURE: 139 MMHG | HEART RATE: 69 BPM

## 2023-05-01 VITALS — SYSTOLIC BLOOD PRESSURE: 128 MMHG | HEART RATE: 72 BPM | TEMPERATURE: 98.2 F | DIASTOLIC BLOOD PRESSURE: 54 MMHG

## 2023-05-01 VITALS — DIASTOLIC BLOOD PRESSURE: 80 MMHG | HEART RATE: 74 BPM | SYSTOLIC BLOOD PRESSURE: 178 MMHG

## 2023-05-01 LAB
BASE EXCESS BLDA CALC-SCNC: -6.5 MMOL/L (ref -2–2)
CO2 BLDA-SCNC: 18 MMOL/L
HCO3 BLDA-SCNC: 17.2 MEQ/L (ref 22–26)
PCO2 BLDA: 27.5 MMHG (ref 35–45)
PO2 BLDA: 64.2 MMHG (ref 80–100)
SAO2 % BLDA: 91.5 % (ref 92–100)

## 2023-05-01 NOTE — NUR
NURSING SHIFT ASSESSMENT COMPLETED. THE PATIENT REPORTED LEFT LEG PAIN 5/10.
THE PATIENT DENIED THE NEED FOR PAIN MEDICATION AT THIS TIME. THE LEFT LEG IS
ELEVATED ON A PILLOW FOR COMFORT. THE PLAN OF CARE WAS DISCUSSED. QUESTIONS
AND CONCERNS WERE ADDRESSED. PERSONAL ITEMS AND CALL LIGHT ARE WITHIN REACH.
BED IN LOW POSITION. WILL MONITOR.

## 2023-05-01 NOTE — NUR
Student nurse assessed pt this am. This nurse additionally assessed pt. Pt has
moderate ecchymosis to left side down to LLE d/t fall @ home. Respirations are
shallow and labored @ rest. O2 on @ 5L via NC. Pt has no cyanosis to lips or
extremities. No cough. Pt denies any pain or discomfort. No complaints or
concerns voiced.

## 2023-05-01 NOTE — NUR
This nurse called in to pt room to assist PCTs w/ providing pt w/ bed
bath. Pt has been incontinent of bowel w/ loose stools. Pt has a small
pin sized abrasion to scrotum that is having some bleeding. This was
reportedly sustained when spouse was assisting pt w/ use of urinal and
urinal scratched pt scrotum. Pressure applied to wound and gauze left in place
to assist w/ potential cont bleeding. While pt being moved up in bed w/
turning sheet, pt reached up to grab rail and sustained skin tear to RFA from
his wristband. Pressure applied to wound. Wound then cleansed with sterile
saline. Skin tear is [id number] to RFA. Gauze drsg applied. Pt apologizes for
this. Pt reassured. RT now present to provide services. Pt has no other
complaints or concerns.

## 2023-05-01 NOTE — NUR
Per Rajwinder at [facility], they are anable to accept this patient due to his weight
and not being able to meet his needs.
SW notified patients wife at bedside that both [facility] and [facility] are unable to
accept this patient. Mireya is willing to let this LUISA send a referral to
Jesse at [facility].
 
Clinical referral sent to [place].

## 2023-05-01 NOTE — NUR
PURPLE PORT ON PICC LINE NON-PATENT. RED PORT PATENT AND WITH GOOD BLOOD
RETURN. [facility] NOTIFIED.

## 2023-05-02 VITALS — SYSTOLIC BLOOD PRESSURE: 122 MMHG | TEMPERATURE: 98.2 F | DIASTOLIC BLOOD PRESSURE: 46 MMHG | HEART RATE: 70 BPM

## 2023-05-02 VITALS — DIASTOLIC BLOOD PRESSURE: 46 MMHG | HEART RATE: 70 BPM | SYSTOLIC BLOOD PRESSURE: 122 MMHG | TEMPERATURE: 98.2 F

## 2023-05-02 VITALS — SYSTOLIC BLOOD PRESSURE: 138 MMHG | TEMPERATURE: 98.8 F | HEART RATE: 72 BPM | DIASTOLIC BLOOD PRESSURE: 50 MMHG

## 2023-05-02 VITALS — TEMPERATURE: 98.4 F | SYSTOLIC BLOOD PRESSURE: 128 MMHG | DIASTOLIC BLOOD PRESSURE: 51 MMHG | HEART RATE: 72 BPM

## 2023-05-02 NOTE — NUR
PATIENT DISCHARGED TO [facility] PER ORDER. DISCHARGE PACKET SENT
WITH PATIENT. PATIENT SENT ON O2 AT 4L VIA NC. REPORT GIVEN TO TRUDY NURSE
AT [facility]. BELONGINGS SENT WITH PATIENT. PICC LINE REMOVED BY
SUPERVISOR, NO BLEEDING NOTED PRIOR TO DISCHARGE, 30 MINUTE FLAT TIME
COMPLETED.

## 2023-05-02 NOTE — NUR
SHIFT ASSESSMENT COMPLETED AND MORNING MEDICATIONS ADMINISTERED PER ORDER.
PATIENT IS ALERT AND ORIENTED X4. DENIES PAIN. LUNGS CTA. BRUISING TO LLE,
SCAB TO LEFT TOE. ABRASION TO SCROTUM. SKIN TEAR TO RIGHT FOREARM. IMMOBILIZER
PLACED BY PT PER ORDER FROM DR. HOGAN. ON O2 VIA NC AT 4L, TOLERATING WELL.
DENIES NEEDS. FAMILY AT BEDSIDE. CALL LIGHT WITHIN REACH.

## 2023-05-04 NOTE — NUR
(late entry 05/02)
 
Patient accepted to [facility] for skilled nursing on this day.  Patient and spouse
updated. More detailed documentation located in patients hard chart due to EMR
system being down at the time of discharge.
 
Patient presented with M[employer].Im form. Education provided and patient verbalizes
his agreement. Signed original placed in the patients chart and copy provided
to patient.
 
Discharge plan: [facility]